# Patient Record
Sex: FEMALE | Race: WHITE
[De-identification: names, ages, dates, MRNs, and addresses within clinical notes are randomized per-mention and may not be internally consistent; named-entity substitution may affect disease eponyms.]

---

## 2019-11-01 ENCOUNTER — HOSPITAL ENCOUNTER (OUTPATIENT)
Dept: HOSPITAL 76 - LAB.WCP | Age: 24
Discharge: HOME | End: 2019-11-01
Attending: ADVANCED PRACTICE MIDWIFE
Payer: COMMERCIAL

## 2019-11-01 DIAGNOSIS — N97.0: Primary | ICD-10-CM

## 2019-11-01 LAB
PROLACTIN SERPL-MCNC: 7.88 NG/ML
T4 SERPL-MCNC: 6.94 UG/DL (ref 6.09–12.23)
TSH SERPL-ACNC: 1.33 UIU/ML (ref 0.34–5.6)

## 2019-11-01 PROCEDURE — 84146 ASSAY OF PROLACTIN: CPT

## 2019-11-01 PROCEDURE — 36415 COLL VENOUS BLD VENIPUNCTURE: CPT

## 2019-11-01 PROCEDURE — 84443 ASSAY THYROID STIM HORMONE: CPT

## 2019-11-01 PROCEDURE — 84436 ASSAY OF TOTAL THYROXINE: CPT

## 2019-11-06 ENCOUNTER — HOSPITAL ENCOUNTER (OUTPATIENT)
Dept: HOSPITAL 76 - LAB.R | Age: 24
Discharge: HOME | End: 2019-11-06
Attending: ADVANCED PRACTICE MIDWIFE
Payer: COMMERCIAL

## 2019-11-06 DIAGNOSIS — N97.0: Primary | ICD-10-CM

## 2019-11-06 PROCEDURE — 84144 ASSAY OF PROGESTERONE: CPT

## 2019-11-11 ENCOUNTER — HOSPITAL ENCOUNTER (OUTPATIENT)
Dept: HOSPITAL 76 - LAB.N | Age: 24
Discharge: HOME | End: 2019-11-11
Attending: ADVANCED PRACTICE MIDWIFE
Payer: COMMERCIAL

## 2019-11-11 DIAGNOSIS — Z32.01: Primary | ICD-10-CM

## 2019-11-11 PROCEDURE — 36415 COLL VENOUS BLD VENIPUNCTURE: CPT

## 2019-11-11 PROCEDURE — 84702 CHORIONIC GONADOTROPIN TEST: CPT

## 2019-11-15 ENCOUNTER — HOSPITAL ENCOUNTER (OUTPATIENT)
Dept: HOSPITAL 76 - EMS | Age: 24
Discharge: ANOTHER HOSPITAL | End: 2019-11-15
Attending: SURGERY
Payer: COMMERCIAL

## 2019-11-15 ENCOUNTER — HOSPITAL ENCOUNTER (EMERGENCY)
Dept: HOSPITAL 76 - ED | Age: 24
Discharge: HOME | End: 2019-11-15
Payer: COMMERCIAL

## 2019-11-15 VITALS — SYSTOLIC BLOOD PRESSURE: 112 MMHG | DIASTOLIC BLOOD PRESSURE: 80 MMHG

## 2019-11-15 DIAGNOSIS — O99.89: Primary | ICD-10-CM

## 2019-11-15 DIAGNOSIS — R10.32: ICD-10-CM

## 2019-11-15 DIAGNOSIS — R55: ICD-10-CM

## 2019-11-15 DIAGNOSIS — Z3A.01: ICD-10-CM

## 2019-11-15 DIAGNOSIS — R00.0: ICD-10-CM

## 2019-11-15 LAB
ALBUMIN DIAFP-MCNC: 4.4 G/DL (ref 3.2–5.5)
ALBUMIN/GLOB SERPL: 1.7 {RATIO} (ref 1–2.2)
ALP SERPL-CCNC: 50 IU/L (ref 42–121)
ALT SERPL W P-5'-P-CCNC: 16 IU/L (ref 10–60)
ANION GAP SERPL CALCULATED.4IONS-SCNC: 8 MMOL/L (ref 6–13)
AST SERPL W P-5'-P-CCNC: 18 IU/L (ref 10–42)
BASOPHILS NFR BLD AUTO: 0 10^3/UL (ref 0–0.1)
BASOPHILS NFR BLD AUTO: 0.2 %
BILIRUB BLD-MCNC: 0.9 MG/DL (ref 0.2–1)
BUN SERPL-MCNC: 9 MG/DL (ref 6–20)
CALCIUM UR-MCNC: 8.8 MG/DL (ref 8.5–10.3)
CHLORIDE SERPL-SCNC: 106 MMOL/L (ref 101–111)
CLARITY UR REFRACT.AUTO: (no result)
CLARITY UR REFRACT.AUTO: CLEAR
CO2 SERPL-SCNC: 24 MMOL/L (ref 21–32)
CREAT SERPLBLD-SCNC: 0.6 MG/DL (ref 0.4–1)
EOSINOPHIL # BLD AUTO: 0.1 10^3/UL (ref 0–0.7)
EOSINOPHIL NFR BLD AUTO: 2.2 %
ERYTHROCYTE [DISTWIDTH] IN BLOOD BY AUTOMATED COUNT: 14.8 % (ref 12–15)
GFRSERPLBLD MDRD-ARVRAT: 123 ML/MIN/{1.73_M2} (ref 89–?)
GLOBULIN SER-MCNC: 2.6 G/DL (ref 2.1–4.2)
GLUCOSE SERPL-MCNC: 94 MG/DL (ref 70–100)
GLUCOSE UR QL STRIP.AUTO: NEGATIVE MG/DL
GLUCOSE UR QL STRIP.AUTO: NEGATIVE MG/DL
HGB UR QL STRIP: 11.3 G/DL (ref 12–16)
KETONES UR QL STRIP.AUTO: (no result) MG/DL
KETONES UR QL STRIP.AUTO: NEGATIVE MG/DL
LIPASE SERPL-CCNC: 33 U/L (ref 22–51)
LYMPHOCYTES # SPEC AUTO: 1.4 10^3/UL (ref 1.5–3.5)
LYMPHOCYTES NFR BLD AUTO: 29 %
MCH RBC QN AUTO: 27.2 PG (ref 27–31)
MCHC RBC AUTO-ENTMCNC: 32.3 G/DL (ref 32–36)
MCV RBC AUTO: 84.1 FL (ref 81–99)
MONOCYTES # BLD AUTO: 0.4 10^3/UL (ref 0–1)
MONOCYTES NFR BLD AUTO: 8.2 %
NEUTROPHILS # BLD AUTO: 3 10^3/UL (ref 1.5–6.6)
NEUTROPHILS # SNV AUTO: 4.9 X10^3/UL (ref 4.8–10.8)
NEUTROPHILS NFR BLD AUTO: 60.2 %
NITRITE UR QL STRIP.AUTO: NEGATIVE
NITRITE UR QL STRIP.AUTO: NEGATIVE
PDW BLD AUTO: 10.6 FL (ref 7.9–10.8)
PH UR STRIP.AUTO: 7 PH (ref 5–7.5)
PH UR STRIP.AUTO: 7.5 PH (ref 5–7.5)
PLATELET # BLD: 203 10^3/UL (ref 130–450)
PROT SPEC-MCNC: 7 G/DL (ref 6.7–8.2)
PROT UR STRIP.AUTO-MCNC: NEGATIVE MG/DL
PROT UR STRIP.AUTO-MCNC: NEGATIVE MG/DL
RBC # UR STRIP.AUTO: NEGATIVE /UL
RBC # UR STRIP.AUTO: NEGATIVE /UL
RBC MAR: 4.16 10^6/UL (ref 4.2–5.4)
SODIUM SERPLBLD-SCNC: 138 MMOL/L (ref 135–145)
SP GR UR STRIP.AUTO: 1.01 (ref 1–1.03)
SP GR UR STRIP.AUTO: 1.01 (ref 1–1.03)
SQUAMOUS URNS QL MICRO: (no result)
UROBILINOGEN UR QL STRIP.AUTO: (no result) E.U./DL
UROBILINOGEN UR QL STRIP.AUTO: (no result) E.U./DL
UROBILINOGEN UR STRIP.AUTO-MCNC: NEGATIVE MG/DL
UROBILINOGEN UR STRIP.AUTO-MCNC: NEGATIVE MG/DL

## 2019-11-15 PROCEDURE — 85025 COMPLETE CBC W/AUTO DIFF WBC: CPT

## 2019-11-15 PROCEDURE — 76817 TRANSVAGINAL US OBSTETRIC: CPT

## 2019-11-15 PROCEDURE — 76801 OB US < 14 WKS SINGLE FETUS: CPT

## 2019-11-15 PROCEDURE — 80053 COMPREHEN METABOLIC PANEL: CPT

## 2019-11-15 PROCEDURE — 93005 ELECTROCARDIOGRAM TRACING: CPT

## 2019-11-15 PROCEDURE — 81003 URINALYSIS AUTO W/O SCOPE: CPT

## 2019-11-15 PROCEDURE — 99284 EMERGENCY DEPT VISIT MOD MDM: CPT

## 2019-11-15 PROCEDURE — 99283 EMERGENCY DEPT VISIT LOW MDM: CPT

## 2019-11-15 PROCEDURE — 87086 URINE CULTURE/COLONY COUNT: CPT

## 2019-11-15 PROCEDURE — 83690 ASSAY OF LIPASE: CPT

## 2019-11-15 PROCEDURE — 36415 COLL VENOUS BLD VENIPUNCTURE: CPT

## 2019-11-15 PROCEDURE — 84702 CHORIONIC GONADOTROPIN TEST: CPT

## 2019-11-15 PROCEDURE — 81001 URINALYSIS AUTO W/SCOPE: CPT

## 2019-11-15 NOTE — ULTRASOUND REPORT
Reason:  pelvic pain, syncope

Procedure Date:  11/15/2019   

Accession Number:  [id number] / [id number]                    

Procedure:  US  - OB Transvaginal CPT Code:  

 

***Final Report***

 

 

FULL RESULT:

 

 

EXAM:

FIRST TRIMESTER OBSTETRIC ULTRASOUND

(Less than 11 weeks)

 

EXAM DATE: 11/15/2019 03:03 PM.

 

CLINICAL HISTORY: Pelvic pain. Syncope. LMP 10/10/2019. HCG 3584.

LMP: 10/10/2019.

 

COMPARISONS: None.

 

TECHNIQUE: Transabdominal and transvaginal ultrasound examination with 

static image documentation.

 

CLINICAL DATES:

EGA 5 weeks, 2 days with DARLING 07/16/2020 based on LMP.

 

ASSESSMENT:

Gestational Sac: Single intrauterine cystic structure is seen within the 

endometrial cavity.  If this were to represent a gestational sac the mean 

gestational sac diameter: 5.2 mm = 5 weeks, 2 days.

Embryo: CRL (crown-rump length) may be visualized although not 

definitively and may measure 1.8 mm.

Cardiac activity: None present.

Yolk sac: Not definitively seen.

Amniotic fluid: Not accurately assessed at this gestational age.

Early placenta: Not visible at this gestational age.

Other: No perigestational fluid collection demonstrated.

 

MATERNAL STRUCTURES:

Uterus: Retroverted. Unremarkable.

Cervix: Closed.

Right Ovary/Adnexa: The ovary measures 4.5 x 2.1 x 2.3 cm, volume 11.3 

cc. Normal echotexture a normal blood flow. Thick-walled cystic 1.5 x 1.0 

x 0.8 cm lesion with peripheral vascularity most likely represents a 

corpus luteal cyst..

Left Ovary/Adnexa: The ovary measures 3.1 x 1.6 x 1.9 cm, volume 4.9 cc.  

Unremarkable.

Free Fluid: Small to moderate volume of pelvic free fluid..

 

Other: None.

IMPRESSION:

1. Intrauterine cystic collection which could represent a gestational 

sac, cyst or focal fluid. If this were to represent a gestational sac 

this would correspond with an estimated gestational age of 5 weeks, 2 

days with DARLING 07/16/2020 based on gestational sac assessment, which is 

concordant with clinical dates. No yolk sac or fetal pole are 

definitively seen although suggested. No secondary findings of ectopic 

pregnancy are identified. Recommend close clinical follow-up and 

correlation with serial serum beta hCG and follow-up ultrasound if 

indicated in 7-10 days.

2. Assigned dating is DARLING 07/16/2020 based on LMP.

3. Normal ovaries with a right ovarian corpus luteal 1.5 cm cyst.

 

[facility]

## 2019-11-15 NOTE — ED PHYSICIAN DOCUMENTATION
History of Present Illness





- Stated complaint


Stated Complaint: SYNCOPAL





- History obtained from


History obtained from: Patient





- History of Present Illness


Timing: Today ( at 5 weeks gestation, she has had some lower abdominal 

cramping today.  No bleeding or fluid loss.  Had a cramp and then had a short 

syncopal episode without injury while walking in her home.  She feels fine now. 

No associated chest pain or trouble breathing.  No significant nausea or 

vomiting.  No diarrhea.)





Review of Systems


Constitutional: denies: Fever, Chills


Cardiac: denies: Chest pain / pressure, Palpitations


Respiratory: denies: Dyspnea, Cough


GI: reports: Abdominal Pain.  denies: Nausea, Vomiting, Diarrhea


: denies: Dysuria, Frequency





PD PAST MEDICAL HISTORY





- Allergies


Allergies/Adverse Reactions: 


                                    Allergies











Allergy/AdvReac Type Severity Reaction Status Date / Time


 


No Known Drug Allergies Allergy   Verified 11/15/19 12:48














PD ED PE NORMAL





- Vitals


Vital signs reviewed: Yes





- General


General: Alert and oriented X 3, No acute distress





- Cardiac


Cardiac: RRR, No murmur





- Respiratory


Respiratory: No respiratory distress, Clear bilaterally





- Abdomen


Abdomen: Normal bowel sounds, Soft, Non tender





- Female 


Female : Other (Bedside ultrasound demonstrates a very full bladder without 

obvious intrauterine pregnancy)





- Back


Back: No CVA TTP, No spinal TTP





- Extremities


Extremities: No edema, No calf tenderness / cord





- Neuro


Neuro: Alert and oriented X 3, Normal speech





Results





- Vitals


Vitals: 


                               Vital Signs - 24 hr











  11/15/19 11/15/19





  12:49 12:59


 


Temperature 36.9 C 


 


Heart Rate 86 86


 


Respiratory 16 16





Rate  


 


Blood Pressure 111/80 111/80


 


O2 Saturation 100 100








                                     Oxygen











O2 Source                      Room air

















- EKG (time done)


  ** 1259


Rate: Rate (enter#) (101)


Rhythm: Sinus tachycardia


Axis: Normal


Intervals: Normal SD


QRS: Normal


Ischemia: Normal ST segments


Computer interpretation: Agree with computer





- Labs


Labs: 


                                Laboratory Tests











  11/15/19 11/15/19 11/15/19





  12:58 13:15 13:15


 


WBC   4.9 


 


RBC   4.16 L 


 


Hgb   11.3 L 


 


Hct   35.0 L 


 


MCV   84.1 


 


MCH   27.2 


 


MCHC   32.3 


 


RDW   14.8 


 


Plt Count   203 


 


MPV   10.6 


 


Neut # (Auto)   3.0 


 


Lymph # (Auto)   1.4 L 


 


Mono # (Auto)   0.4 


 


Eos # (Auto)   0.1 


 


Baso # (Auto)   0.0 


 


Absolute Nucleated RBC   0.00 


 


Nucleated RBC %   0.0 


 


Sodium    138


 


Potassium    3.7


 


Chloride    106


 


Carbon Dioxide    24


 


Anion Gap    8.0


 


BUN    9


 


Creatinine    0.6


 


Estimated GFR (MDRD)    123


 


Glucose    94


 


Calcium    8.8


 


Total Bilirubin    0.9


 


AST    18


 


ALT    16


 


Alkaline Phosphatase    50


 


Total Protein    7.0


 


Albumin    4.4


 


Globulin    2.6


 


Albumin/Globulin Ratio    1.7


 


Lipase    33


 


HCG, Quant   


 


Urine Color  YELLOW  


 


Urine Clarity  HAZY  


 


Urine pH  7.5  


 


Ur Specific Gravity  1.010  


 


Urine Protein  NEGATIVE  


 


Urine Glucose (UA)  NEGATIVE  


 


Urine Ketones  NEGATIVE  


 


Urine Occult Blood  NEGATIVE  


 


Urine Nitrite  NEGATIVE  


 


Urine Bilirubin  NEGATIVE  


 


Urine Urobilinogen  0.2 (NORMAL)  


 


Ur Leukocyte Esterase  TRACE H  


 


Urine RBC  None Seen  


 


Urine WBC  6-10 H  


 


Ur Squamous Epith Cells  MANY Squamous H  


 


Urine Bacteria  Few  


 


Ur Microscopic Review  INDICATED  


 


Urine Culture Comments  NOT INDICATED  














  11/15/19 11/15/19





  13:15 13:35


 


WBC  


 


RBC  


 


Hgb  


 


Hct  


 


MCV  


 


MCH  


 


MCHC  


 


RDW  


 


Plt Count  


 


MPV  


 


Neut # (Auto)  


 


Lymph # (Auto)  


 


Mono # (Auto)  


 


Eos # (Auto)  


 


Baso # (Auto)  


 


Absolute Nucleated RBC  


 


Nucleated RBC %  


 


Sodium  


 


Potassium  


 


Chloride  


 


Carbon Dioxide  


 


Anion Gap  


 


BUN  


 


Creatinine  


 


Estimated GFR (MDRD)  


 


Glucose  


 


Calcium  


 


Total Bilirubin  


 


AST  


 


ALT  


 


Alkaline Phosphatase  


 


Total Protein  


 


Albumin  


 


Globulin  


 


Albumin/Globulin Ratio  


 


Lipase  


 


HCG, Quant  3584.00 


 


Urine Color   YELLOW


 


Urine Clarity   CLEAR


 


Urine pH   7.0


 


Ur Specific Gravity   1.015


 


Urine Protein   NEGATIVE


 


Urine Glucose (UA)   NEGATIVE


 


Urine Ketones   TRACE


 


Urine Occult Blood   NEGATIVE


 


Urine Nitrite   NEGATIVE


 


Urine Bilirubin   NEGATIVE


 


Urine Urobilinogen   0.2 (NORMAL)


 


Ur Leukocyte Esterase   NEGATIVE


 


Urine RBC  


 


Urine WBC  


 


Ur Squamous Epith Cells  


 


Urine Bacteria  


 


Ur Microscopic Review   NOT INDICATED


 


Urine Culture Comments   NOT INDICATED














- Rads (name of study)


  ** OB sono


Radiology: See rad report (Likely gestational sac concordant with dates without 

other findings consistent with ectopic)





PD MEDICAL DECISION MAKING





- ED course


ED course: 





24-year-old woman presents with a syncopal episode in early pregnancy.  Ectopic 

is considered but unlikely based on the work-up today.





Otherwise a negative work-up.





Anemia was discussed with the patient, she says this is chronic and long-

standing.





Departure





- Departure


Disposition: 01 Home, Self Care


Clinical Impression: 


Syncope


Qualifiers:


 Syncope type: vasovagal syncope Qualified Code(s): R55 - Syncope and collapse





Pregnancy


Qualifiers:


 Weeks of gestation: less than 8 weeks Qualified Code(s): Z3A.01 - Less than 8 

weeks gestation of pregnancy





Condition: Good


Record reviewed to determine appropriate education?: Yes


Instructions:  ED Syncope Vasovagal


Comments: 


If you develop more severe cramping, bleeding, or otherwise worsening symptoms 

please return for reevaluation.  Otherwise follow-up with your OB as scheduled.

## 2019-11-29 ENCOUNTER — HOSPITAL ENCOUNTER (OUTPATIENT)
Dept: HOSPITAL 76 - DI | Age: 24
Discharge: HOME | End: 2019-11-29
Attending: ADVANCED PRACTICE MIDWIFE
Payer: COMMERCIAL

## 2019-11-29 DIAGNOSIS — Z32.01: Primary | ICD-10-CM

## 2019-11-29 PROCEDURE — 76801 OB US < 14 WKS SINGLE FETUS: CPT

## 2019-11-29 NOTE — ULTRASOUND REPORT
Reason:  PREGNANCY TEST POSITIVE

Procedure Date:  11/29/2019   

Accession Number:  [id number] / [id number]                    

Procedure:  US  - OB First Trimester CPT Code:  

 

***Final Report***

 

 

FULL RESULT:

 

 

EXAM:

FIRST TRIMESTER OBSTETRIC ULTRASOUND

(Less than 11 weeks)

 

EXAM DATE: 11/29/2019 11:17 AM.

 

CLINICAL HISTORY: PREGNANCY TEST POSITIVE.

LMP: 10/12/2019.

 

COMPARISONS: None.

 

TECHNIQUE: Transabdominal and transvaginal ultrasound examination with 

static image documentation.

 

CLINICAL DATES:

EGA 6 weeks 6 days with DARLING 07/18/2020 based on LMP .

 

ASSESSMENT:

Gestational Sac: Single intrauterine.  Mean gestational sac diameter: 

19.2 mm = 6 weeks 6 days.

Embryo: CRL (crown-rump length) 8.7 mm = 6 weeks 6 days.

Cardiac activity: 1343 beats per minute.

Yolk sac: 4.5 mm.

Amniotic fluid: Not accurately assessed at this gestational age.

Early placenta: Not visible at this gestational age.

Other: Small perigestational anechoic fluid collection demonstrated 0.4 x 

0.6 x 0.5 cm and 0.6 x 0.6 x 0.6 cm..

 

MATERNAL STRUCTURES:

Uterus:  Retroverted. Unremarkable.

Cervix: Closed.

Right Ovary/Adnexa: The ovary measures 5 x 2.2 x 2.1 cm, volume 12 cc. 

Corpus luteal cyst 2.6 cm.

Left Ovary/Adnexa: The ovary measures 1.8 x 2.8 x 1.7 cm, volume 4.3 cc.  

Unremarkable.

Free Fluid: Small.

 

Other: None.

IMPRESSION:

1. Single viable intrauterine pregnancy at EGA 6 weeks 6 days with DARLING 

07/18/2020 based on crown-rump length, which is concordant with clinical 

dates. Small perigestational fluid collections largest 0.6 cm

 

 

[facility]

## 2019-12-06 ENCOUNTER — HOSPITAL ENCOUNTER (OUTPATIENT)
Dept: HOSPITAL 76 - LAB.R | Age: 24
Discharge: HOME | End: 2019-12-06
Attending: ADVANCED PRACTICE MIDWIFE
Payer: COMMERCIAL

## 2019-12-06 DIAGNOSIS — Z36.89: Primary | ICD-10-CM

## 2019-12-06 LAB
AMPHET UR QL SCN: NEGATIVE
BENZODIAZ UR QL SCN: NEGATIVE
COCAINE UR-SCNC: NEGATIVE UMOL/L
METHADONE UR QL SCN: NEGATIVE
METHAMPHET UR QL SCN: NEGATIVE
OPIATES UR QL SCN: NEGATIVE
T VAGINALIS RRNA GENITAL QL PROBE: NEGATIVE
VOLATILE DRUGS POS SERPL SCN: (no result)

## 2019-12-06 PROCEDURE — 87086 URINE CULTURE/COLONY COUNT: CPT

## 2019-12-06 PROCEDURE — 80306 DRUG TEST PRSMV INSTRMNT: CPT

## 2019-12-06 PROCEDURE — 87661 TRICHOMONAS VAGINALIS AMPLIF: CPT

## 2019-12-06 PROCEDURE — 87491 CHLMYD TRACH DNA AMP PROBE: CPT

## 2019-12-06 PROCEDURE — 87591 N.GONORRHOEAE DNA AMP PROB: CPT

## 2020-01-07 ENCOUNTER — HOSPITAL ENCOUNTER (OUTPATIENT)
Dept: HOSPITAL 76 - LAB.R | Age: 25
Discharge: HOME | End: 2020-01-07
Attending: ADVANCED PRACTICE MIDWIFE
Payer: COMMERCIAL

## 2020-01-07 DIAGNOSIS — R30.0: Primary | ICD-10-CM

## 2020-01-07 PROCEDURE — 87086 URINE CULTURE/COLONY COUNT: CPT

## 2020-01-17 ENCOUNTER — HOSPITAL ENCOUNTER (EMERGENCY)
Dept: HOSPITAL 76 - ED | Age: 25
LOS: 1 days | Discharge: HOME | End: 2020-01-18
Payer: COMMERCIAL

## 2020-01-17 DIAGNOSIS — R10.2: ICD-10-CM

## 2020-01-17 DIAGNOSIS — O99.89: Primary | ICD-10-CM

## 2020-01-17 DIAGNOSIS — O44.42: ICD-10-CM

## 2020-01-17 DIAGNOSIS — Z3A.14: ICD-10-CM

## 2020-01-17 LAB
ALBUMIN DIAFP-MCNC: 3.7 G/DL (ref 3.2–5.5)
ALBUMIN/GLOB SERPL: 1.1 {RATIO} (ref 1–2.2)
ALP SERPL-CCNC: 45 IU/L (ref 42–121)
ALT SERPL W P-5'-P-CCNC: 11 IU/L (ref 10–60)
ANION GAP SERPL CALCULATED.4IONS-SCNC: 9 MMOL/L (ref 6–13)
AST SERPL W P-5'-P-CCNC: 15 IU/L (ref 10–42)
BASOPHILS NFR BLD AUTO: 0 10^3/UL (ref 0–0.1)
BASOPHILS NFR BLD AUTO: 0.2 %
BILIRUB BLD-MCNC: 0.6 MG/DL (ref 0.2–1)
BUN SERPL-MCNC: 6 MG/DL (ref 6–20)
CALCIUM UR-MCNC: 9.5 MG/DL (ref 8.5–10.3)
CHLORIDE SERPL-SCNC: 103 MMOL/L (ref 101–111)
CLARITY UR REFRACT.AUTO: CLEAR
CO2 SERPL-SCNC: 23 MMOL/L (ref 21–32)
CREAT SERPLBLD-SCNC: 0.5 MG/DL (ref 0.4–1)
EOSINOPHIL # BLD AUTO: 0.1 10^3/UL (ref 0–0.7)
EOSINOPHIL NFR BLD AUTO: 1.7 %
ERYTHROCYTE [DISTWIDTH] IN BLOOD BY AUTOMATED COUNT: 13.5 % (ref 12–15)
GFRSERPLBLD MDRD-ARVRAT: 152 ML/MIN/{1.73_M2} (ref 89–?)
GLOBULIN SER-MCNC: 3.3 G/DL (ref 2.1–4.2)
GLUCOSE SERPL-MCNC: 101 MG/DL (ref 70–100)
GLUCOSE UR QL STRIP.AUTO: NEGATIVE MG/DL
HCG UR QL: POSITIVE
HGB UR QL STRIP: 11.3 G/DL (ref 12–16)
KETONES UR QL STRIP.AUTO: NEGATIVE MG/DL
LIPASE SERPL-CCNC: 40 U/L (ref 22–51)
LYMPHOCYTES # SPEC AUTO: 1.6 10^3/UL (ref 1.5–3.5)
LYMPHOCYTES NFR BLD AUTO: 24.2 %
MCH RBC QN AUTO: 27.4 PG (ref 27–31)
MCHC RBC AUTO-ENTMCNC: 32.8 G/DL (ref 32–36)
MCV RBC AUTO: 83.5 FL (ref 81–99)
MONOCYTES # BLD AUTO: 0.4 10^3/UL (ref 0–1)
MONOCYTES NFR BLD AUTO: 5.6 %
NEUTROPHILS # BLD AUTO: 4.4 10^3/UL (ref 1.5–6.6)
NEUTROPHILS # SNV AUTO: 6.4 X10^3/UL (ref 4.8–10.8)
NEUTROPHILS NFR BLD AUTO: 68 %
NITRITE UR QL STRIP.AUTO: NEGATIVE
PDW BLD AUTO: 10.9 FL (ref 7.9–10.8)
PH UR STRIP.AUTO: 6 PH (ref 5–7.5)
PLATELET # BLD: 181 10^3/UL (ref 130–450)
PROT SPEC-MCNC: 7 G/DL (ref 6.7–8.2)
PROT UR STRIP.AUTO-MCNC: NEGATIVE MG/DL
RBC # UR STRIP.AUTO: NEGATIVE /UL
RBC MAR: 4.13 10^6/UL (ref 4.2–5.4)
SODIUM SERPLBLD-SCNC: 135 MMOL/L (ref 135–145)
SP GR UR STRIP.AUTO: 1.02 (ref 1–1.03)
SP GR UR STRIP.AUTO: 1.02 (ref 1–1.03)
UROBILINOGEN UR QL STRIP.AUTO: (no result) E.U./DL
UROBILINOGEN UR STRIP.AUTO-MCNC: NEGATIVE MG/DL

## 2020-01-17 PROCEDURE — 85025 COMPLETE CBC W/AUTO DIFF WBC: CPT

## 2020-01-17 PROCEDURE — 81003 URINALYSIS AUTO W/O SCOPE: CPT

## 2020-01-17 PROCEDURE — 87086 URINE CULTURE/COLONY COUNT: CPT

## 2020-01-17 PROCEDURE — 80053 COMPREHEN METABOLIC PANEL: CPT

## 2020-01-17 PROCEDURE — 81001 URINALYSIS AUTO W/SCOPE: CPT

## 2020-01-17 PROCEDURE — 81025 URINE PREGNANCY TEST: CPT

## 2020-01-17 PROCEDURE — 36415 COLL VENOUS BLD VENIPUNCTURE: CPT

## 2020-01-17 PROCEDURE — 76805 OB US >/= 14 WKS SNGL FETUS: CPT

## 2020-01-17 PROCEDURE — 99284 EMERGENCY DEPT VISIT MOD MDM: CPT

## 2020-01-17 PROCEDURE — 83690 ASSAY OF LIPASE: CPT

## 2020-01-17 NOTE — ULTRASOUND REPORT
Reason:  15 weeks pregnant, pelvic pain

Procedure Date:  01/17/2020   

Accession Number:  [id number] / [id number]                    

Procedure:  US  - OB 14+ Weeks CPT Code:  

 

***Final Report***

 

 

FULL RESULT:

 

 

EXAM:

LIMITED OBSTETRICAL ULTRASOUND

 

EXAM DATE: 1/17/2020 09:54 PM.

 

CLINICAL HISTORY: 15 weeks pregnant, pelvic pain.

 

COMPARISON: None.

 

TECHNIQUE: Real-time sonographic evaluation of the fetus performed by the 

sonographer. Multiple representative static images were saved for review. 

Additional transvaginal imaging to more accurately evaluate cervical 

length/placental position/etc.

 

DATING:

 

Established EGA 14 weeks/2 days with DARLING 7 15 20.

 

GENERAL EVALUATION

Mireles pregnancy.

Cardiac activity: 140 bpm.

Fetal movement: Visualized.

Presentation: Variable.

Placenta: Low-lying position. Less than 2 cm from internal loss, likely 

due to early pregnancy.

Amniotic fluid: Normal. MEGHANN 11.7 cm. MVP 3.4 cm.

 

FETAL BIOMETRY:

 

BPD: 2.59 cm, 14 weeks 4 days

HC: 9.79 cm, 14 weeks 4 days

FL: 1.22 cm, 13 weeks 4 days

AC: 8.12 cm, 14 weeks 3 days

 

US age: 14 weeks 2 days, DARLING of 7/15/2020.

 

MATERNAL STRUCTURES

Left ovary: 3.6 x 1.3 x 1.8 cm. Normal vascular flow seen.

 

Cervix: Long and closed

IMPRESSION:

 

1. Mireles live intrauterine pregnancy with gestational age 14 weeks/2 

days based on current ultrasound.

 

2. Low-lying placenta, less than 2 cm from internal os is likely 

secondary to early pregnancy. Cervix long and closed.

 

3. Detailed fetal anatomic survey and placental position to be reassessed 

between 18-20 weeks of gestation.

 

[facility]

## 2020-01-17 NOTE — ED PHYSICIAN DOCUMENTATION
PD HPI FEMALE 





- Stated complaint


Stated Complaint: PELVIC PX/HIP PX





- Chief complaint


Chief Complaint: Abd Pain





- History obtained from


History obtained from: Patient





- History of Present Illness


Timing - onset: How many weeks ago (3-4)


Timing - duration: Weeks


Timing - details: Gradual onset, Waxing and waning


Pain level max: 8


Associated symptoms: Back pain, Pelvic pain.  No: Fever, Abdominal pain, Vaginal

pain, Vaginal bleeding, Vaginal discharge, Dysuria, Urinary frequency, Hematuria


Contributing factors: Pregnant (15 weeks)


OB-GYN History: G (2), P (1), Prior vag delivery


Recently seen: Other (evaluated by midwife weeks ago, rx macrobid for possible 

UTI (patient says culture was inconclusive/mixed w/ possible contaminant spp). 

Her pain persists and she called her midwife today and was advised to come to ED

for further evaluation)





Review of Systems


Constitutional: denies: Fever, Chills, Sweats


GI: reports: Nausea.  denies: Abdominal Pain, Vomiting


: reports: Now pregnant EGA (15 weeks).  denies: Dysuria, Frequency, Vaginal 

bleeding


Musculoskeletal: reports: Back pain (pelvic pain radiates to back)





PD PAST MEDICAL HISTORY





- Past Medical History


Past Medical History: No





- Past Surgical History


Past Surgical History: No





- Present Medications


Home Medications: 


                                Ambulatory Orders











 Medication  Instructions  Recorded  Confirmed


 


No Known Home Medications  01/18/20 01/18/20














- Allergies


Allergies/Adverse Reactions: 


                                    Allergies











Allergy/AdvReac Type Severity Reaction Status Date / Time


 


latex AdvReac  Itching Verified 01/17/20 16:54














- Social History


Does the pt smoke?: No


Smoking Status: Never smoker


Does the pt drink ETOH?: No


Does the pt have substance abuse?: No





- Immunizations


Immunizations are current?: Yes





- POLST


Patient has POLST: No





PD ED PE NORMAL





- Vitals


Vital signs reviewed: Yes





- General


General: Alert and oriented X 3, No acute distress, Well developed/nourished





- HEENT


HEENT: Moist mucous membranes





- Cardiac


Cardiac: RRR, No murmur





- Respiratory


Respiratory: No respiratory distress, Clear bilaterally





- Abdomen


Abdomen: Soft, Non distended, Other (mild tenderness across anterior pelvis 

without rebound or guarding)





- Back


Back: No CVA TTP





Results





- Vitals


Vitals: 


                                     Oxygen











O2 Source                      Room air

















- Labs


Labs: 


                                Laboratory Tests











  01/17/20 01/17/20 01/17/20





  17:05 17:05 17:30


 


WBC  6.4  


 


RBC  4.13 L  


 


Hgb  11.3 L  


 


Hct  34.5 L  


 


MCV  83.5  


 


MCH  27.4  


 


MCHC  32.8  


 


RDW  13.5  


 


Plt Count  181  


 


MPV  10.9 H  


 


Neut # (Auto)  4.4  


 


Lymph # (Auto)  1.6  


 


Mono # (Auto)  0.4  


 


Eos # (Auto)  0.1  


 


Baso # (Auto)  0.0  


 


Absolute Nucleated RBC  0.00  


 


Nucleated RBC %  0.0  


 


Sodium   135 


 


Potassium   3.2 L 


 


Chloride   103 


 


Carbon Dioxide   23 


 


Anion Gap   9.0 


 


BUN   6 


 


Creatinine   0.5 


 


Estimated GFR (MDRD)   152 


 


Glucose   101 H 


 


Calcium   9.5 


 


Total Bilirubin   0.6 


 


AST   15 


 


ALT   11 


 


Alkaline Phosphatase   45 


 


Total Protein   7.0 


 


Albumin   3.7 


 


Globulin   3.3 


 


Albumin/Globulin Ratio   1.1 


 


Lipase   40 


 


Urine Color    YELLOW


 


Urine Clarity    CLEAR


 


Urine pH    6.0


 


Ur Specific Gravity    1.025


 


Urine Protein    NEGATIVE


 


Urine Glucose (UA)    NEGATIVE


 


Urine Ketones    NEGATIVE


 


Urine Occult Blood    NEGATIVE


 


Urine Nitrite    NEGATIVE


 


Urine Bilirubin    NEGATIVE


 


Urine Urobilinogen    1 (NORMAL)


 


Ur Leukocyte Esterase    NEGATIVE


 


Ur Microscopic Review    NOT INDICATED


 


Urine Culture Comments    NOT INDICATED


 


Urine HCG, Qual   














  01/17/20





  17:30


 


WBC 


 


RBC 


 


Hgb 


 


Hct 


 


MCV 


 


MCH 


 


MCHC 


 


RDW 


 


Plt Count 


 


MPV 


 


Neut # (Auto) 


 


Lymph # (Auto) 


 


Mono # (Auto) 


 


Eos # (Auto) 


 


Baso # (Auto) 


 


Absolute Nucleated RBC 


 


Nucleated RBC % 


 


Sodium 


 


Potassium 


 


Chloride 


 


Carbon Dioxide 


 


Anion Gap 


 


BUN 


 


Creatinine 


 


Estimated GFR (MDRD) 


 


Glucose 


 


Calcium 


 


Total Bilirubin 


 


AST 


 


ALT 


 


Alkaline Phosphatase 


 


Total Protein 


 


Albumin 


 


Globulin 


 


Albumin/Globulin Ratio 


 


Lipase 


 


Urine Color 


 


Urine Clarity 


 


Urine pH 


 


Ur Specific Gravity  1.025


 


Urine Protein 


 


Urine Glucose (UA) 


 


Urine Ketones 


 


Urine Occult Blood 


 


Urine Nitrite 


 


Urine Bilirubin 


 


Urine Urobilinogen 


 


Ur Leukocyte Esterase 


 


Ur Microscopic Review 


 


Urine Culture Comments 


 


Urine HCG, Qual  POSITIVE














- Rads (name of study)


  ** OB 14w+ US


Radiology: Prelim report reviewed, See rad report





PD MEDICAL DECISION MAKING





- ED course


Complexity details: reviewed results, re-evaluated patient, considered 

differential, d/w patient





Departure





- Departure


Disposition: 01 Home, Self Care


Clinical Impression: 


 Pregnancy, Abdominal pain





Condition: Good


Instructions:  ED Pelvic Pain Preg UKO 2 or 3 Tri


Follow-Up: 


Sheree Ospina, ERICK, ARNP [Primary Care Provider] - 


Discharge Date/Time: 01/18/20 00:34

## 2020-01-18 VITALS — SYSTOLIC BLOOD PRESSURE: 110 MMHG | DIASTOLIC BLOOD PRESSURE: 65 MMHG

## 2020-01-24 ENCOUNTER — HOSPITAL ENCOUNTER (OUTPATIENT)
Dept: HOSPITAL 76 - EMS | Age: 25
Discharge: ANOTHER HOSPITAL | End: 2020-01-24
Attending: SURGERY
Payer: COMMERCIAL

## 2020-01-24 ENCOUNTER — HOSPITAL ENCOUNTER (EMERGENCY)
Dept: HOSPITAL 76 - ED | Age: 25
Discharge: HOME | End: 2020-01-24
Payer: COMMERCIAL

## 2020-01-24 VITALS — DIASTOLIC BLOOD PRESSURE: 79 MMHG | SYSTOLIC BLOOD PRESSURE: 118 MMHG

## 2020-01-24 DIAGNOSIS — R55: ICD-10-CM

## 2020-01-24 DIAGNOSIS — Z3A.16: ICD-10-CM

## 2020-01-24 DIAGNOSIS — R10.30: ICD-10-CM

## 2020-01-24 DIAGNOSIS — O99.89: Primary | ICD-10-CM

## 2020-01-24 LAB
ALBUMIN DIAFP-MCNC: 3.3 G/DL (ref 3.2–5.5)
ALBUMIN/GLOB SERPL: 1.1 {RATIO} (ref 1–2.2)
ALP SERPL-CCNC: 42 IU/L (ref 42–121)
ALT SERPL W P-5'-P-CCNC: 13 IU/L (ref 10–60)
ANION GAP SERPL CALCULATED.4IONS-SCNC: 8 MMOL/L (ref 6–13)
AST SERPL W P-5'-P-CCNC: 14 IU/L (ref 10–42)
BASOPHILS NFR BLD AUTO: 0 10^3/UL (ref 0–0.1)
BASOPHILS NFR BLD AUTO: 0.2 %
BILIRUB BLD-MCNC: 0.8 MG/DL (ref 0.2–1)
BUN SERPL-MCNC: 8 MG/DL (ref 6–20)
CALCIUM UR-MCNC: 8.5 MG/DL (ref 8.5–10.3)
CASTS URNS QL MICRO: (no result) /LPF
CHLORIDE SERPL-SCNC: 106 MMOL/L (ref 101–111)
CLARITY UR REFRACT.AUTO: (no result)
CO2 SERPL-SCNC: 21 MMOL/L (ref 21–32)
CREAT SERPLBLD-SCNC: 0.5 MG/DL (ref 0.4–1)
EOSINOPHIL # BLD AUTO: 0.1 10^3/UL (ref 0–0.7)
EOSINOPHIL NFR BLD AUTO: 0.9 %
ERYTHROCYTE [DISTWIDTH] IN BLOOD BY AUTOMATED COUNT: 13.5 % (ref 12–15)
GFRSERPLBLD MDRD-ARVRAT: 152 ML/MIN/{1.73_M2} (ref 89–?)
GLOBULIN SER-MCNC: 3 G/DL (ref 2.1–4.2)
GLUCOSE SERPL-MCNC: 89 MG/DL (ref 70–100)
GLUCOSE UR QL STRIP.AUTO: NEGATIVE MG/DL
HGB UR QL STRIP: 10.5 G/DL (ref 12–16)
KETONES UR QL STRIP.AUTO: NEGATIVE MG/DL
LIPASE SERPL-CCNC: 32 U/L (ref 22–51)
LYMPHOCYTES # SPEC AUTO: 1.1 10^3/UL (ref 1.5–3.5)
LYMPHOCYTES NFR BLD AUTO: 19.9 %
MCH RBC QN AUTO: 27.7 PG (ref 27–31)
MCHC RBC AUTO-ENTMCNC: 33.5 G/DL (ref 32–36)
MCV RBC AUTO: 82.6 FL (ref 81–99)
MONOCYTES # BLD AUTO: 0.3 10^3/UL (ref 0–1)
MONOCYTES NFR BLD AUTO: 6 %
MUCOUS THREADS URNS QL MICRO: (no result)
NEUTROPHILS # BLD AUTO: 4 10^3/UL (ref 1.5–6.6)
NEUTROPHILS # SNV AUTO: 5.5 X10^3/UL (ref 4.8–10.8)
NEUTROPHILS NFR BLD AUTO: 72.8 %
NITRITE UR QL STRIP.AUTO: NEGATIVE
PDW BLD AUTO: 10.9 FL (ref 7.9–10.8)
PH UR STRIP.AUTO: 7 PH (ref 5–7.5)
PLATELET # BLD: 138 10^3/UL (ref 130–450)
PROT SPEC-MCNC: 6.3 G/DL (ref 6.7–8.2)
PROT UR STRIP.AUTO-MCNC: NEGATIVE MG/DL
RBC # UR STRIP.AUTO: NEGATIVE /UL
RBC # URNS HPF: (no result) /HPF (ref 0–5)
RBC MAR: 3.79 10^6/UL (ref 4.2–5.4)
SODIUM SERPLBLD-SCNC: 135 MMOL/L (ref 135–145)
SP GR UR STRIP.AUTO: 1.01 (ref 1–1.03)
SQUAMOUS URNS QL MICRO: (no result)
UROBILINOGEN UR QL STRIP.AUTO: (no result) E.U./DL
UROBILINOGEN UR STRIP.AUTO-MCNC: NEGATIVE MG/DL

## 2020-01-24 PROCEDURE — 99284 EMERGENCY DEPT VISIT MOD MDM: CPT

## 2020-01-24 PROCEDURE — 85025 COMPLETE CBC W/AUTO DIFF WBC: CPT

## 2020-01-24 PROCEDURE — 81003 URINALYSIS AUTO W/O SCOPE: CPT

## 2020-01-24 PROCEDURE — 87086 URINE CULTURE/COLONY COUNT: CPT

## 2020-01-24 PROCEDURE — 99283 EMERGENCY DEPT VISIT LOW MDM: CPT

## 2020-01-24 PROCEDURE — 83690 ASSAY OF LIPASE: CPT

## 2020-01-24 PROCEDURE — 80053 COMPREHEN METABOLIC PANEL: CPT

## 2020-01-24 PROCEDURE — 36415 COLL VENOUS BLD VENIPUNCTURE: CPT

## 2020-01-24 PROCEDURE — 81001 URINALYSIS AUTO W/SCOPE: CPT

## 2020-01-24 NOTE — ED PHYSICIAN DOCUMENTATION
PD HPI SYNCOPE





- Stated complaint


Stated Complaint: NEAR SYNCOPAL





- Chief complaint


Chief Complaint: Neuro





- History obtained from


History obtained from: Patient





- History of Present Illness


Witnessed: Witnessed


Timing - onset: How many minutes ago (1)


Duration: Minutes (1)


Associated symptoms: Nausea / vomiting, Abdominal pain.  No: Headache, Chest 

pain


Contributing factors: Noxious stimulae (she was standing in line at store or 

such, and had onset of lower abd cramp with nausea, then felt lightheaded and 

lowered to the ground. No fall nor injury. Has had this happen abruptly a couple

of other times during this pregnancy. No vaginal bleeding. Has history of 

endometriosis in the past with surgery for adhesions in the past.).  No: Recent 

med change, Decreased PO intake


Injury occurred: No: Fell, Head injury, Neck injury


Similar symptoms before: No diagnosis


Recently seen: Clinic (normal prenatal visits.), Emergency Dept (had abd pain 

and syncope when agout 7 weeks pregnant. Felt to be dehydration at the time.)





Review of Systems


Constitutional: denies: Fever, Chills


Nose: denies: Rhinorrhea / runny nose, Congestion


Throat: denies: Sore throat


Respiratory: denies: Cough


GI: reports: Abdominal Pain (for several minutes, improved here in ER.), Nausea,

Vomiting, Constipation.  denies: Diarrhea


: denies: Dysuria, Frequency


Neurologic: denies: Generalized weakness, Confused, Altered mental status, 

Headache





PD PAST MEDICAL HISTORY





- Past Medical History


Past Medical History: Yes


Cardiovascular: None


Respiratory: None


Neuro: None


Endocrine/Autoimmune: None


GI: None


GYN: Endometriosis


: None


HEENT: None


Psych: None


Musculoskeletal: None


Derm: None





- Past Surgical History


Past Surgical History: No





- Present Medications


Home Medications: 


                                Ambulatory Orders











 Medication  Instructions  Recorded  Confirmed


 


Docusate Sodium 100 mg PO DAILY #30 capsule 01/24/20 


 


Naproxen 375 mg PO BID #20 tablet 01/24/20 














- Allergies


Allergies/Adverse Reactions: 


                                    Allergies











Allergy/AdvReac Type Severity Reaction Status Date / Time


 


latex AdvReac  Itching Verified 01/17/20 16:54














- Social History


Does the pt smoke?: No


Smoking Status: Never smoker


Does the pt drink ETOH?: No


Does the pt have substance abuse?: No





- Immunizations


Immunizations are current?: Yes





- POLST


Patient has POLST: No





PD ED PE NORMAL





- Vitals


Vital signs reviewed: Yes





- General


General: Alert and oriented X 3, No acute distress, Well developed/nourished





- HEENT


HEENT: Moist mucous membranes, Pharynx benign





- Neck


Neck: Supple, no meningeal sign, No adenopathy





- Cardiac


Cardiac: RRR, No murmur





- Respiratory


Respiratory: Clear bilaterally, Other





- Abdomen


Abdomen: Normal bowel sounds, Soft, Non distended, No organomegaly, Other 

(gravid with fundus few cm below umbilicus c/w dates. bedside U/S showing normal

movement, fluid, FHR, and no free fluid in pelvis. )





Results





- Vitals


Vitals: 


                                     Oxygen











O2 Source                      Room air

















- Labs


Labs: 


                                Laboratory Tests











  01/24/20 01/24/20 01/24/20





  10:50 10:50 11:06


 


WBC  5.5  


 


RBC  3.79 L  


 


Hgb  10.5 L  


 


Hct  31.3 L  


 


MCV  82.6  


 


MCH  27.7  


 


MCHC  33.5  


 


RDW  13.5  


 


Plt Count  138  


 


MPV  10.9 H  


 


Neut # (Auto)  4.0  


 


Lymph # (Auto)  1.1 L  


 


Mono # (Auto)  0.3  


 


Eos # (Auto)  0.1  


 


Baso # (Auto)  0.0  


 


Absolute Nucleated RBC  0.00  


 


Nucleated RBC %  0.0  


 


Sodium   135 


 


Potassium   3.6 


 


Chloride   106 


 


Carbon Dioxide   21 


 


Anion Gap   8.0 


 


BUN   8 


 


Creatinine   0.5 


 


Estimated GFR (MDRD)   152 


 


Glucose   89 


 


Calcium   8.5 


 


Total Bilirubin   0.8 


 


AST   14 


 


ALT   13 


 


Alkaline Phosphatase   42 


 


Total Protein   6.3 L 


 


Albumin   3.3 


 


Globulin   3.0 


 


Albumin/Globulin Ratio   1.1 


 


Lipase   32 


 


Urine Color    YELLOW


 


Urine Clarity    HAZY


 


Urine pH    7.0


 


Ur Specific Gravity    1.015


 


Urine Protein    NEGATIVE


 


Urine Glucose (UA)    NEGATIVE


 


Urine Ketones    NEGATIVE


 


Urine Occult Blood    NEGATIVE


 


Urine Nitrite    NEGATIVE


 


Urine Bilirubin    NEGATIVE


 


Urine Urobilinogen    1 (NORMAL)


 


Ur Leukocyte Esterase    NEGATIVE


 


Urine RBC    0-5


 


Urine WBC    0-3


 


Ur Squamous Epith Cells    MANY Squamous H


 


Amorphous Sediment    Few


 


Urine Bacteria    Many H


 


Urine Casts    0-2 Granular Casts


 


Urine Mucus    Marked Strands


 


Ur Microscopic Review    INDICATED


 


Urine Culture Comments    NOT INDICATED














PD MEDICAL DECISION MAKING





- ED course


Complexity details: reviewed results (bedside U/S shows normal movement, heart 

rate, and no free fluid in pelvis. her lower pain could be prior adhesions from 

endometriosis or cyst with pregnancy. No obvious problem with the pregnancy. ), 

considered differential, d/w patient





Departure





- Departure


Disposition: 01 Home, Self Care


Clinical Impression: 


 Lower abdominal pain, Syncope, near





Pregnancy


Qualifiers:


 Weeks of gestation: 16 weeks Qualified Code(s): Z3A.16 - 16 weeks gestation of 

pregnancy





Condition: Stable


Record reviewed to determine appropriate education?: Yes


Instructions:  ED Abdominal Pain Unkn Cause


Follow-Up: 


Sheree Ospina CNM, DILIP [Primary Care Provider] - 


Prescriptions: 


Docusate Sodium 100 mg PO DAILY #30 capsule


Naproxen 375 mg PO BID #20 tablet


Comments: 


Stay well-hydrated.  Consider short term anti-inflammatory of naproxen twice 

daily for a week.  Add Tylenol if needed for pains.  Also add docusate stool 

softener daily for the next several days to week and then as needed for 

constipation.  Follow-up with your OB/GYN.





The cause of your pain episode is not clear.  Consider the possibility the 

uterus had an impact on prior adhesions or scar tissue in some of those were 

disrupted.  That would be the reasoning on the anti-inflammatory over the next 

week.


Forms:  Activity restrictions


Discharge Date/Time: 01/24/20 12:42

## 2020-02-05 ENCOUNTER — HOSPITAL ENCOUNTER (OUTPATIENT)
Dept: HOSPITAL 76 - LAB.N | Age: 25
Discharge: HOME | End: 2020-02-05
Attending: ADVANCED PRACTICE MIDWIFE
Payer: COMMERCIAL

## 2020-02-05 DIAGNOSIS — Z36.89: Primary | ICD-10-CM

## 2020-02-05 DIAGNOSIS — O99.89: ICD-10-CM

## 2020-02-05 DIAGNOSIS — R30.0: ICD-10-CM

## 2020-02-05 LAB
BASOPHILS NFR BLD AUTO: 0 10^3/UL (ref 0–0.1)
BASOPHILS NFR BLD AUTO: 0.3 %
EOSINOPHIL # BLD AUTO: 0.1 10^3/UL (ref 0–0.7)
EOSINOPHIL NFR BLD AUTO: 0.7 %
ERYTHROCYTE [DISTWIDTH] IN BLOOD BY AUTOMATED COUNT: 13.7 % (ref 12–15)
HGB UR QL STRIP: 10.5 G/DL (ref 12–16)
LYMPHOCYTES # SPEC AUTO: 1.2 10^3/UL (ref 1.5–3.5)
LYMPHOCYTES NFR BLD AUTO: 17.6 %
MCH RBC QN AUTO: 26.4 PG (ref 27–31)
MCHC RBC AUTO-ENTMCNC: 31.8 G/DL (ref 32–36)
MCV RBC AUTO: 82.9 FL (ref 81–99)
MONOCYTES # BLD AUTO: 0.4 10^3/UL (ref 0–1)
MONOCYTES NFR BLD AUTO: 5.3 %
NEUTROPHILS # BLD AUTO: 5.1 10^3/UL (ref 1.5–6.6)
NEUTROPHILS # SNV AUTO: 6.8 X10^3/UL (ref 4.8–10.8)
NEUTROPHILS NFR BLD AUTO: 75.7 %
PDW BLD AUTO: 12 FL (ref 7.9–10.8)
PLATELET # BLD: 176 10^3/UL (ref 130–450)
RBC MAR: 3.98 10^6/UL (ref 4.2–5.4)

## 2020-02-05 PROCEDURE — 86901 BLOOD TYPING SEROLOGIC RH(D): CPT

## 2020-02-05 PROCEDURE — 87340 HEPATITIS B SURFACE AG IA: CPT

## 2020-02-05 PROCEDURE — 86762 RUBELLA ANTIBODY: CPT

## 2020-02-05 PROCEDURE — 87086 URINE CULTURE/COLONY COUNT: CPT

## 2020-02-05 PROCEDURE — 81599 UNLISTED MAAA: CPT

## 2020-02-05 PROCEDURE — 85025 COMPLETE CBC W/AUTO DIFF WBC: CPT

## 2020-02-05 PROCEDURE — 36415 COLL VENOUS BLD VENIPUNCTURE: CPT

## 2020-02-05 PROCEDURE — 86850 RBC ANTIBODY SCREEN: CPT

## 2020-02-05 PROCEDURE — 87389 HIV-1 AG W/HIV-1&-2 AB AG IA: CPT

## 2020-02-05 PROCEDURE — 86803 HEPATITIS C AB TEST: CPT

## 2020-02-05 PROCEDURE — 86900 BLOOD TYPING SEROLOGIC ABO: CPT

## 2020-02-06 LAB
HEPATITIS B SURFACE ANTIGEN: (no result)
HEPATITIS C ANTIBODY: (no result)
HIV AG/AB 4TH GEN: (no result)
SIGNAL TO CUT-OFF: 0 (ref ?–1)

## 2020-02-14 ENCOUNTER — HOSPITAL ENCOUNTER (OUTPATIENT)
Dept: HOSPITAL 76 - DI | Age: 25
Discharge: HOME | End: 2020-02-14
Attending: ADVANCED PRACTICE MIDWIFE
Payer: COMMERCIAL

## 2020-02-14 DIAGNOSIS — Z36.89: Primary | ICD-10-CM

## 2020-02-14 DIAGNOSIS — Z3A.18: ICD-10-CM

## 2020-02-14 DIAGNOSIS — O44.42: ICD-10-CM

## 2020-02-14 PROCEDURE — 76811 OB US DETAILED SNGL FETUS: CPT

## 2020-02-17 NOTE — ULTRASOUND REPORT
Reason:   SCREENING, SUPERVISION OF PREGNANCY

Procedure Date:  2020   

Accession Number:  [id number] / [id number]                    

Procedure:  US  - OB Detailed Fetal Eval CPT Code:  

 

***Final Report***

 

 

FULL RESULT:

 

 

EXAM:

COMPLETE OBSTETRICAL ULTRASOUND

 

EXAM DATE: 2020 01:17 PM.

 

CLINICAL HISTORY: Fetal anatomic survey.

 

COMPARISON: OB 14+ WEEKS 2020 9:54 PM.

 

TECHNIQUE: Real-time sonographic evaluation of the fetus performed by the 

sonographer. Multiple representative static images were saved for review. 

Additional transvaginal imaging to more accurately evaluate cervical 

length/placental position/etc.

 

DATING:

Established EGA 18 weeks 2 days with DARLING 07/15/2020 based on stated dates.

EGA 17 weeks 6 days with DARLING 2020 based on LMP.

EGA 17 weeks 6 days with DARLING 2020 based on the current ultrasound.

 

GENERAL EVALUATION

Mireles pregnancy.

Cardiac activity: 145 bpm.

Fetal movement: Visualized.

Presentation: Cephalic/variable.

Placenta: Anterior position. Low lying placenta. Placental margin is 1.3 

cm from the internal os.

Umbilical cord: 3 vessel cord. Central placental cord origin.

Amniotic fluid: Subjectively normal. MVP 4.3 cm.

 

FETAL BIOMETRY

Bi-Parietal Diameter (BPD): 3.9 cm, 17 weeks 6 days

Head Circumference (HC):   14.6 cm, 17 weeks 5 days

Abdominal Circumference (AC): 12.2 cm, 17 weeks 6 days

Femur Length (FL): 2.6 cm 17 weeks 6 days ays

 

Estimated Fetal Weight: 212 g, 20.5 percentile for 18 weeks 2 days.

 

FETAL ANATOMY

The intracranial structures, face/nose/lips, spine, 4 chamber heart and 

outflow tracts, stomach, abdominal wall and cord insertion, diaphragm, 

kidneys, bladder, and extremities were visualized and demonstrate no 

abnormality. Profile/nasal bone suboptimally seen.

 

MATERNAL STRUCTURES

Uterus: Unremarkable.

Cervix: Long and closed. Transabdominal length 2.8 cm.

Right ovary/adnexa: Adnexa unremarkable. Right ovary not well seen due to 

bowel gas.

Left ovary/adnexa: Unremarkable.

Free fluid: None.

IMPRESSION:

1. Mireles live intrauterine pregnancy with gestational age 18 weeks 2 

days based on stated dates.

2. Estimated fetal weight is within expected limits for assigned dating.

3. The visualized fetal anatomy appears normal. No fetal anatomic 

abnormalities are detected at this time. Profile/nasal bone suboptimally 

seen due to active fetus.

4. Low lying placenta.

 

RADIA

## 2020-03-06 ENCOUNTER — HOSPITAL ENCOUNTER (OUTPATIENT)
Dept: HOSPITAL 76 - DI | Age: 25
Discharge: HOME | End: 2020-03-06
Attending: ADVANCED PRACTICE MIDWIFE
Payer: COMMERCIAL

## 2020-03-06 DIAGNOSIS — O44.02: Primary | ICD-10-CM

## 2020-03-06 DIAGNOSIS — Z3A.21: ICD-10-CM

## 2020-03-06 PROCEDURE — 76816 OB US FOLLOW-UP PER FETUS: CPT

## 2020-03-06 NOTE — ULTRASOUND REPORT
Reason:  LOW LYING PLACENTA, F/U TO FAS

Procedure Date:  03/06/2020   

Accession Number:  [id number] / [id number]                    

Procedure:  US  - OB F/U or Repeat CPT Code:  

 

***Final Report***

 

 

FULL RESULT:

 

 

EXAM:

FOLLOW-UP OBSTETRICAL ULTRASOUND

 

EXAM DATE: 03/06/2020 09:32 AM.

 

CLINICAL HISTORY: Low lying placenta, follow-up to FAS.

 

COMPARISON: OB DETAILED FETAL EVAL 02/14/2020 12:03 PM.

 

TECHNIQUE: Real-time sonographic evaluation of the fetus performed by the 

sonographer.  Multiple representative static images were saved for review.

 

DATING:

Established EGA 21 weeks 2 days with DARLING 07/15/2020 based on working due 

date.

EGA 20 weeks 6 days with DARLING 07/18/2020 based on first ultrasound and LMP.

 

GENERAL EVALUATION

Mireles pregnancy.

Cardiac activity: 160 bpm.

Fetal movement: Visualized.

Presentation: Cephalic.

Placenta: Anterior position, not low lying.

Amniotic fluid: Normal. MEGHANN 14.9 cm. MVP 4.2 cm.

 

FETAL ANATOMY

The profile view including nasal bone is well visualized and no 

abnormality is seen.

IMPRESSION:

1. Mireles live intrauterine pregnancy with gestational age 21 weeks 2 

days based on working due date.

2. Anterior placenta within normal limits, not low lying

3. Normal completion of the fetal anatomy survey.

 

RADIA

## 2020-03-13 ENCOUNTER — HOSPITAL ENCOUNTER (OUTPATIENT)
Dept: HOSPITAL 76 - LAB.R | Age: 25
Discharge: HOME | End: 2020-03-13
Attending: ADVANCED PRACTICE MIDWIFE
Payer: COMMERCIAL

## 2020-03-13 DIAGNOSIS — N76.0: Primary | ICD-10-CM

## 2020-03-13 LAB
C KRUSEI DNA VAG QL NAA+PROBE: NEGATIVE
CANDIDA DNA VAG QL NAA+PROBE: POSITIVE
T VAGINALIS RRNA GENITAL QL PROBE: NEGATIVE

## 2020-03-13 PROCEDURE — 87661 TRICHOMONAS VAGINALIS AMPLIF: CPT

## 2020-03-13 PROCEDURE — 87801 DETECT AGNT MULT DNA AMPLI: CPT

## 2020-04-08 ENCOUNTER — HOSPITAL ENCOUNTER (OUTPATIENT)
Dept: HOSPITAL 76 - WFO | Age: 25
Discharge: HOME | End: 2020-04-08
Attending: OBSTETRICS & GYNECOLOGY
Payer: COMMERCIAL

## 2020-04-08 VITALS — DIASTOLIC BLOOD PRESSURE: 78 MMHG | SYSTOLIC BLOOD PRESSURE: 115 MMHG

## 2020-04-08 DIAGNOSIS — O99.89: Primary | ICD-10-CM

## 2020-04-08 DIAGNOSIS — Z3A.26: ICD-10-CM

## 2020-04-08 DIAGNOSIS — R10.2: ICD-10-CM

## 2020-04-08 LAB
C KRUSEI DNA VAG QL NAA+PROBE: NEGATIVE
CANDIDA DNA VAG QL NAA+PROBE: NEGATIVE
CLARITY UR REFRACT.AUTO: (no result)
GLUCOSE UR QL STRIP.AUTO: NEGATIVE MG/DL
KETONES UR QL STRIP.AUTO: NEGATIVE MG/DL
NITRITE UR QL STRIP.AUTO: NEGATIVE
PH UR STRIP.AUTO: 7.5 PH (ref 5–7.5)
PROT UR STRIP.AUTO-MCNC: NEGATIVE MG/DL
RBC # UR STRIP.AUTO: NEGATIVE /UL
RBC # URNS HPF: (no result) /HPF (ref 0–5)
SP GR UR STRIP.AUTO: 1.02 (ref 1–1.03)
SQUAMOUS URNS QL MICRO: (no result)
T VAGINALIS RRNA GENITAL QL PROBE: NEGATIVE
T VAGINALIS RRNA GENITAL QL PROBE: NEGATIVE
UROBILINOGEN UR QL STRIP.AUTO: (no result) E.U./DL
UROBILINOGEN UR STRIP.AUTO-MCNC: NEGATIVE MG/DL

## 2020-04-08 PROCEDURE — 87491 CHLMYD TRACH DNA AMP PROBE: CPT

## 2020-04-08 PROCEDURE — 87591 N.GONORRHOEAE DNA AMP PROB: CPT

## 2020-04-08 PROCEDURE — 87661 TRICHOMONAS VAGINALIS AMPLIF: CPT

## 2020-04-08 PROCEDURE — 82731 ASSAY OF FETAL FIBRONECTIN: CPT

## 2020-04-08 PROCEDURE — 87086 URINE CULTURE/COLONY COUNT: CPT

## 2020-04-08 PROCEDURE — 87801 DETECT AGNT MULT DNA AMPLI: CPT

## 2020-04-08 PROCEDURE — 87081 CULTURE SCREEN ONLY: CPT

## 2020-04-08 PROCEDURE — 76817 TRANSVAGINAL US OBSTETRIC: CPT

## 2020-04-08 PROCEDURE — 81001 URINALYSIS AUTO W/SCOPE: CPT

## 2020-04-08 PROCEDURE — 99215 OFFICE O/P EST HI 40 MIN: CPT

## 2020-04-08 NOTE — PROVIDER PROGRESS NOTE
- HPI


Chief Complaint: Labor Check


Current Pregnancy: 


                                                               Current Pregnancy





EDU                              07/15/20


Gestation                        26 Weeks and 0 Days


                          2


Para                             1





Vital Signs











Temperature  98.8 F   20 12:53


 


Heart Rate  102 H  20 12:53


 


Respiratory Rate  18   20 12:53


 


Blood Pressure  115/78   20 12:53


 


O2 Saturation  100   20 12:53




















Temperature  98.8 F   20 12:53


 


Heart Rate  102 H  20 12:53


 


Respiratory Rate  18   20 12:53


 


Blood Pressure  115/78   20 12:53


 


O2 Saturation  100   20 12:53














- Exam





GEN: Mild distress, NAD at close of visit


CV: RR


RESP: Nl effort


ABD: gravid, S&NT/ND


SVE: bladder tender, posterior cervix, high and difficult to palpate. Closed


 mod andres 10x10 accels occ vars--> AGA


TOCO; Quiet





UA wnl


FFN neg


TVCL 3.92 cm





- Procedures


NST Procedure: 


Cat I tracing/ Appropriate for gestational age


Service Date of procedure: 20


Procedure Details: 





NST for suspected  labor


AGA and quiet TOCO


TVCL 3.9 cm, reassuring


FFN neg


Findings: 





AGA and quiet TOCO


TVCL 3.9 cm, reassuring


FFN neg





- Plan


Plan: 





Pelvic pain at 26+0 wga


Last IC more than 2 days ago





FFN neg


TVCL 3.9 cm; reassuring


UA wnl





GCCT and vaginitis panel pending





Patient appears comfortable at close of visit


Reassured regarding TVCL and FFN as suspicion low for PTL


Will contact patient with vaginitis panel results


Warning signs reviewed





FU tomorrow in clinic for scheduled PNV





A total of 2.5 hours was spent with patient in evaluating pelvic pain in 

pregnancy.

## 2020-04-08 NOTE — ULTRASOUND REPORT
Reason:  rule out  labor

Procedure Date:  2020   

Accession Number:  [id number] / [id number]                    

Procedure:  US  - OB Transvaginal CPT Code:  

 

***Final Report***

 

 

FULL RESULT:

 

 

EXAM:

OB TRANSVAGINAL

 

EXAM DATE: 2020 03:10 PM.

 

CLINICAL HISTORY: Concern for  labor. Evaluate cervical length. 

Rule out  labor.

LMP: Unknown.

 

COMPARISONS: OB TRANSVAGINAL 11/15/2019 1:52 PM.

 

TECHNIQUE: Transvaginal and transabdominal ultrasound examination with 

static image documentation.

 

CLINICAL DATES:

EGA 26 weeks 0 days with DARLING 07/15/2020 based on established EGA.

 

ASSESSMENT:

 

Single living intrauterine fetus in breech presentation. Fetal cardiac 

activity is 149 bpm. Placental location is anterior. No previa.

 

The cervix appears long and closed, measures 3.9 cm in length.

IMPRESSION:

1. Long and closed cervix measuring 3.9 cm in length. See above.

 

RADIA

## 2020-05-26 ENCOUNTER — HOSPITAL ENCOUNTER (OUTPATIENT)
Dept: HOSPITAL 76 - LAB.WCP | Age: 25
Discharge: HOME | End: 2020-05-26
Attending: ADVANCED PRACTICE MIDWIFE
Payer: COMMERCIAL

## 2020-05-26 DIAGNOSIS — Z36.89: ICD-10-CM

## 2020-05-26 DIAGNOSIS — Z34.90: Primary | ICD-10-CM

## 2020-05-26 LAB
BASOPHILS NFR BLD AUTO: 0 10^3/UL (ref 0–0.1)
BASOPHILS NFR BLD AUTO: 0.3 %
EOSINOPHIL # BLD AUTO: 0.1 10^3/UL (ref 0–0.7)
EOSINOPHIL NFR BLD AUTO: 1.1 %
ERYTHROCYTE [DISTWIDTH] IN BLOOD BY AUTOMATED COUNT: 16.7 % (ref 12–15)
HGB UR QL STRIP: 8.3 G/DL (ref 12–16)
LYMPHOCYTES # SPEC AUTO: 1.7 10^3/UL (ref 1.5–3.5)
LYMPHOCYTES NFR BLD AUTO: 15.9 %
MCH RBC QN AUTO: 21.6 PG (ref 27–31)
MCHC RBC AUTO-ENTMCNC: 28.2 G/DL (ref 32–36)
MCV RBC AUTO: 76.4 FL (ref 81–99)
MONOCYTES # BLD AUTO: 0.6 10^3/UL (ref 0–1)
MONOCYTES NFR BLD AUTO: 6 %
NEUTROPHILS # BLD AUTO: 7.8 10^3/UL (ref 1.5–6.6)
NEUTROPHILS # SNV AUTO: 10.5 X10^3/UL (ref 4.8–10.8)
NEUTROPHILS NFR BLD AUTO: 74.5 %
PDW BLD AUTO: 11.1 FL (ref 7.9–10.8)
PLAT MORPH BLD: (no result)
PLATELET # BLD: 216 10^3/UL (ref 130–450)
PLATELET BLD QL SMEAR: (no result)
RBC MAR: 3.85 10^6/UL (ref 4.2–5.4)
RBC MORPH BLD: (no result)

## 2020-05-26 PROCEDURE — 82947 ASSAY GLUCOSE BLOOD QUANT: CPT

## 2020-05-26 PROCEDURE — 85025 COMPLETE CBC W/AUTO DIFF WBC: CPT

## 2020-05-26 PROCEDURE — 36415 COLL VENOUS BLD VENIPUNCTURE: CPT

## 2020-05-26 PROCEDURE — 86850 RBC ANTIBODY SCREEN: CPT

## 2020-06-19 ENCOUNTER — HOSPITAL ENCOUNTER (OUTPATIENT)
Dept: HOSPITAL 76 - LAB.WCP | Age: 25
Discharge: HOME | End: 2020-06-19
Attending: ADVANCED PRACTICE MIDWIFE
Payer: COMMERCIAL

## 2020-06-19 DIAGNOSIS — D64.9: ICD-10-CM

## 2020-06-19 DIAGNOSIS — Z3A.00: ICD-10-CM

## 2020-06-19 DIAGNOSIS — O99.019: Primary | ICD-10-CM

## 2020-06-19 LAB
ERYTHROCYTE [DISTWIDTH] IN BLOOD BY AUTOMATED COUNT: 18.4 % (ref 12–15)
HGB UR QL STRIP: 7.5 G/DL (ref 12–16)
IRON SATN MFR SERPL: 4 % (ref 20–50)
IRON SERPL-MCNC: 24 UG/DL (ref 28–170)
MCH RBC QN AUTO: 20.9 PG (ref 27–31)
MCHC RBC AUTO-ENTMCNC: 29.3 G/DL (ref 32–36)
MCV RBC AUTO: 71.3 FL (ref 81–99)
NEUTROPHILS # SNV AUTO: 10.5 X10^3/UL (ref 4.8–10.8)
PDW BLD AUTO: 10.5 FL (ref 7.9–10.8)
PLATELET # BLD: 218 10^3/UL (ref 130–450)
RBC MAR: 3.59 10^6/UL (ref 4.2–5.4)
TIBC SERPL-MCNC: 655 UG/DL (ref 250–450)
TRANSFERRIN SERPL-MCNC: 468 MG/DL (ref 192–382)

## 2020-06-19 PROCEDURE — 85027 COMPLETE CBC AUTOMATED: CPT

## 2020-06-19 PROCEDURE — 36415 COLL VENOUS BLD VENIPUNCTURE: CPT

## 2020-06-19 PROCEDURE — 81599 UNLISTED MAAA: CPT

## 2020-06-19 PROCEDURE — 83540 ASSAY OF IRON: CPT

## 2020-06-19 PROCEDURE — 84466 ASSAY OF TRANSFERRIN: CPT

## 2020-06-19 PROCEDURE — 85018 HEMOGLOBIN: CPT

## 2020-06-19 PROCEDURE — 85014 HEMATOCRIT: CPT

## 2020-06-19 PROCEDURE — 85041 AUTOMATED RBC COUNT: CPT

## 2020-06-19 PROCEDURE — 82728 ASSAY OF FERRITIN: CPT

## 2020-06-19 PROCEDURE — 83021 HEMOGLOBIN CHROMOTOGRAPHY: CPT

## 2020-06-24 ENCOUNTER — HOSPITAL ENCOUNTER (OUTPATIENT)
Dept: HOSPITAL 76 - LAB.R | Age: 25
Discharge: HOME | End: 2020-06-24
Attending: ADVANCED PRACTICE MIDWIFE
Payer: COMMERCIAL

## 2020-06-24 ENCOUNTER — HOSPITAL ENCOUNTER (OUTPATIENT)
Dept: HOSPITAL 76 - WFO | Age: 25
Discharge: HOME | End: 2020-06-24
Attending: ADVANCED PRACTICE MIDWIFE
Payer: COMMERCIAL

## 2020-06-24 VITALS — DIASTOLIC BLOOD PRESSURE: 87 MMHG | SYSTOLIC BLOOD PRESSURE: 125 MMHG

## 2020-06-24 DIAGNOSIS — Z3A.37: ICD-10-CM

## 2020-06-24 DIAGNOSIS — Z36.85: Primary | ICD-10-CM

## 2020-06-24 DIAGNOSIS — D50.9: ICD-10-CM

## 2020-06-24 DIAGNOSIS — O99.013: Primary | ICD-10-CM

## 2020-06-24 LAB — T VAGINALIS RRNA GENITAL QL PROBE: NEGATIVE

## 2020-06-24 PROCEDURE — 59025 FETAL NON-STRESS TEST: CPT

## 2020-06-24 PROCEDURE — 87591 N.GONORRHOEAE DNA AMP PROB: CPT

## 2020-06-24 PROCEDURE — 87661 TRICHOMONAS VAGINALIS AMPLIF: CPT

## 2020-06-24 PROCEDURE — 87491 CHLMYD TRACH DNA AMP PROBE: CPT

## 2020-06-24 PROCEDURE — 96375 TX/PRO/DX INJ NEW DRUG ADDON: CPT

## 2020-06-24 PROCEDURE — 87797 DETECT AGENT NOS DNA DIR: CPT

## 2020-06-24 PROCEDURE — 96374 THER/PROPH/DIAG INJ IV PUSH: CPT

## 2020-06-25 ENCOUNTER — HOSPITAL ENCOUNTER (OUTPATIENT)
Dept: HOSPITAL 76 - WFO | Age: 25
Discharge: HOME | End: 2020-06-25
Attending: ADVANCED PRACTICE MIDWIFE
Payer: COMMERCIAL

## 2020-06-25 VITALS — SYSTOLIC BLOOD PRESSURE: 128 MMHG | DIASTOLIC BLOOD PRESSURE: 85 MMHG

## 2020-06-25 DIAGNOSIS — Z3A.37: ICD-10-CM

## 2020-06-25 DIAGNOSIS — O47.1: Primary | ICD-10-CM

## 2020-06-25 LAB
CLARITY UR REFRACT.AUTO: CLEAR
GLUCOSE UR QL STRIP.AUTO: NEGATIVE MG/DL
KETONES UR QL STRIP.AUTO: NEGATIVE MG/DL
NITRITE UR QL STRIP.AUTO: NEGATIVE
PH UR STRIP.AUTO: 6.5 PH (ref 5–7.5)
PROT UR STRIP.AUTO-MCNC: NEGATIVE MG/DL
RBC # UR STRIP.AUTO: NEGATIVE /UL
SP GR UR STRIP.AUTO: 1.02 (ref 1–1.03)
UROBILINOGEN UR QL STRIP.AUTO: (no result) E.U./DL
UROBILINOGEN UR STRIP.AUTO-MCNC: NEGATIVE MG/DL

## 2020-06-25 PROCEDURE — 99213 OFFICE O/P EST LOW 20 MIN: CPT

## 2020-06-25 PROCEDURE — 87086 URINE CULTURE/COLONY COUNT: CPT

## 2020-06-25 PROCEDURE — 81001 URINALYSIS AUTO W/SCOPE: CPT

## 2020-06-25 PROCEDURE — 81003 URINALYSIS AUTO W/O SCOPE: CPT

## 2020-06-25 NOTE — PROCEDURE REPORT
- HPI


Diagnosis/Indication for NST: Other (Iron infusion, monitor fetal tolerance)


                                                               Current Pregnancy





EDU                              07/15/20


Gestation                        37 Weeks and 0 Days


                          2


Para                             1





Vital Signs











Temperature  37.4 C   20 17:08


 


Heart Rate  112 H  20 17:08


 


Respiratory Rate  17   20 17:08


 


Blood Pressure  133/82 H  20 17:08


 


O2 Saturation  99   20 17:08




















Temperature  37.3 C   20 19:47


 


Heart Rate  114 H  20 19:47


 


Respiratory Rate  18   20 19:47


 


Blood Pressure  125/87 H  20 19:47


 


O2 Saturation  99   20 19:47














- NST Procedure


NST Procedure





Start Date                       20


Start Time                       17:00


Stop Time                        17:26


Vibroacoustic Stimulation Used   Yes


Patient States Fetal Movement    Yes











- Results and Plan


Findings/Impression: 





NST performed 2020


NST Read 2020





Findings:


Reassuring


Baseline 140s, moderate variability, accels and no decels





Plan:


No further NSTs indicated at this time unless further infusions are needed





Final Diagnosis


23yo  and 37.0 wks presented for an iron infusion for severe iron 

deficiency anemia


Continue with regular prenatal care

## 2020-07-06 ENCOUNTER — HOSPITAL ENCOUNTER (OUTPATIENT)
Dept: HOSPITAL 76 - LAB | Age: 25
Discharge: HOME | End: 2020-07-06
Attending: ADVANCED PRACTICE MIDWIFE
Payer: COMMERCIAL

## 2020-07-06 ENCOUNTER — HOSPITAL ENCOUNTER (OUTPATIENT)
Dept: HOSPITAL 76 - WFO | Age: 25
Discharge: HOME | End: 2020-07-06
Attending: ADVANCED PRACTICE MIDWIFE
Payer: COMMERCIAL

## 2020-07-06 VITALS — SYSTOLIC BLOOD PRESSURE: 123 MMHG | DIASTOLIC BLOOD PRESSURE: 83 MMHG

## 2020-07-06 DIAGNOSIS — O99.019: ICD-10-CM

## 2020-07-06 DIAGNOSIS — D64.9: ICD-10-CM

## 2020-07-06 DIAGNOSIS — Z3A.00: ICD-10-CM

## 2020-07-06 DIAGNOSIS — Z3A.38: ICD-10-CM

## 2020-07-06 DIAGNOSIS — O47.1: Primary | ICD-10-CM

## 2020-07-06 DIAGNOSIS — O99.019: Primary | ICD-10-CM

## 2020-07-06 LAB
HGB UR QL STRIP: 9.9 G/DL (ref 12–16)
IGF BP1 VAG QL: NEGATIVE
MCH RBC QN AUTO: 23.9 PG (ref 27–31)
MCHC RBC AUTO-ENTMCNC: 29.7 G/DL (ref 32–36)
MCV RBC AUTO: 80.2 FL (ref 81–99)
NEUTROPHILS # SNV AUTO: 9.1 X10^3/UL (ref 4.8–10.8)
PDW BLD AUTO: 10.7 FL (ref 7.9–10.8)
PLATELET # BLD: 194 10^3/UL (ref 130–450)
RBC MAR: 4.15 10^6/UL (ref 4.2–5.4)

## 2020-07-06 PROCEDURE — 36415 COLL VENOUS BLD VENIPUNCTURE: CPT

## 2020-07-06 PROCEDURE — 99213 OFFICE O/P EST LOW 20 MIN: CPT

## 2020-07-06 PROCEDURE — 85027 COMPLETE CBC AUTOMATED: CPT

## 2020-07-06 PROCEDURE — 84112 EVAL AMNIOTIC FLUID PROTEIN: CPT

## 2020-07-11 ENCOUNTER — HOSPITAL ENCOUNTER (INPATIENT)
Dept: HOSPITAL 76 - WFO | Age: 25
LOS: 3 days | Discharge: HOME | End: 2020-07-14
Attending: OBSTETRICS & GYNECOLOGY | Admitting: OBSTETRICS & GYNECOLOGY
Payer: COMMERCIAL

## 2020-07-11 DIAGNOSIS — Z3A.39: ICD-10-CM

## 2020-07-11 DIAGNOSIS — D50.9: ICD-10-CM

## 2020-07-11 DIAGNOSIS — F41.9: ICD-10-CM

## 2020-07-11 DIAGNOSIS — R00.0: ICD-10-CM

## 2020-07-11 DIAGNOSIS — E07.9: ICD-10-CM

## 2020-07-11 PROCEDURE — 85025 COMPLETE CBC W/AUTO DIFF WBC: CPT

## 2020-07-11 PROCEDURE — 81599 UNLISTED MAAA: CPT

## 2020-07-11 PROCEDURE — 84550 ASSAY OF BLOOD/URIC ACID: CPT

## 2020-07-11 PROCEDURE — 83615 LACTATE (LD) (LDH) ENZYME: CPT

## 2020-07-11 PROCEDURE — 86780 TREPONEMA PALLIDUM: CPT

## 2020-07-11 PROCEDURE — 36415 COLL VENOUS BLD VENIPUNCTURE: CPT

## 2020-07-11 PROCEDURE — 99213 OFFICE O/P EST LOW 20 MIN: CPT

## 2020-07-11 PROCEDURE — 84450 TRANSFERASE (AST) (SGOT): CPT

## 2020-07-12 LAB
AST SERPL W P-5'-P-CCNC: 27 IU/L (ref 10–42)
BASOPHILS NFR BLD AUTO: 0 10^3/UL (ref 0–0.1)
BASOPHILS NFR BLD AUTO: 0.1 10^3/UL (ref 0–0.1)
BASOPHILS NFR BLD AUTO: 0.3 %
BASOPHILS NFR BLD AUTO: 0.3 %
EOSINOPHIL # BLD AUTO: 0 10^3/UL (ref 0–0.7)
EOSINOPHIL # BLD AUTO: 0.1 10^3/UL (ref 0–0.7)
EOSINOPHIL NFR BLD AUTO: 0.1 %
EOSINOPHIL NFR BLD AUTO: 1 %
HGB UR QL STRIP: 10.9 G/DL (ref 12–16)
HGB UR QL STRIP: 9.9 G/DL (ref 12–16)
LYMPHOCYTES # SPEC AUTO: 2.1 10^3/UL (ref 1.5–3.5)
LYMPHOCYTES # SPEC AUTO: 2.2 10^3/UL (ref 1.5–3.5)
LYMPHOCYTES NFR BLD AUTO: 19.3 %
LYMPHOCYTES NFR BLD AUTO: 9.9 %
MCH RBC QN AUTO: 24.2 PG (ref 27–31)
MCH RBC QN AUTO: 24.3 PG (ref 27–31)
MCHC RBC AUTO-ENTMCNC: 29.2 G/DL (ref 32–36)
MCHC RBC AUTO-ENTMCNC: 29.5 G/DL (ref 32–36)
MCV RBC AUTO: 82.2 FL (ref 81–99)
MCV RBC AUTO: 83.3 FL (ref 81–99)
MONOCYTES # BLD AUTO: 0.8 10^3/UL (ref 0–1)
MONOCYTES # BLD AUTO: 1 10^3/UL (ref 0–1)
MONOCYTES NFR BLD AUTO: 4.6 %
MONOCYTES NFR BLD AUTO: 6.7 %
NEUTROPHILS # BLD AUTO: 17.7 10^3/UL (ref 1.5–6.6)
NEUTROPHILS # BLD AUTO: 8.1 10^3/UL (ref 1.5–6.6)
NEUTROPHILS # SNV AUTO: 11.3 X10^3/UL (ref 4.8–10.8)
NEUTROPHILS # SNV AUTO: 21 X10^3/UL (ref 4.8–10.8)
NEUTROPHILS NFR BLD AUTO: 71.5 %
NEUTROPHILS NFR BLD AUTO: 84.1 %
PDW BLD AUTO: 10.1 FL (ref 7.9–10.8)
PDW BLD AUTO: 9.9 FL (ref 7.9–10.8)
PLAT MORPH BLD: (no result)
PLAT MORPH BLD: (no result)
PLATELET # BLD: 285 10^3/UL (ref 130–450)
PLATELET # BLD: 297 10^3/UL (ref 130–450)
PLATELET BLD QL SMEAR: (no result)
PLATELET BLD QL SMEAR: (no result)
RBC MAR: 4.09 10^6/UL (ref 4.2–5.4)
RBC MAR: 4.48 10^6/UL (ref 4.2–5.4)
RBC MORPH BLD: (no result)
RBC MORPH BLD: (no result)
URATE SERPL-MCNC: 4.4 MG/DL (ref 2.6–7.2)

## 2020-07-12 RX ADMIN — ONDANSETRON PRN MG: 2 INJECTION INTRAMUSCULAR; INTRAVENOUS at 07:25

## 2020-07-12 RX ADMIN — Medication PRN MLS/HR: at 21:17

## 2020-07-12 RX ADMIN — SODIUM CHLORIDE, POTASSIUM CHLORIDE, SODIUM LACTATE AND CALCIUM CHLORIDE SCH MLS/HR: 600; 310; 30; 20 INJECTION, SOLUTION INTRAVENOUS at 16:50

## 2020-07-12 RX ADMIN — Medication PRN MLS/HR: at 21:18

## 2020-07-12 RX ADMIN — SODIUM CHLORIDE, POTASSIUM CHLORIDE, SODIUM LACTATE AND CALCIUM CHLORIDE SCH MLS/HR: 600; 310; 30; 20 INJECTION, SOLUTION INTRAVENOUS at 01:00

## 2020-07-12 RX ADMIN — CALCIUM CARBONATE (ANTACID) CHEW TAB 500 MG SCH MG: 500 CHEW TAB at 05:05

## 2020-07-12 RX ADMIN — ONDANSETRON PRN MG: 2 INJECTION INTRAMUSCULAR; INTRAVENOUS at 21:43

## 2020-07-12 RX ADMIN — SODIUM CHLORIDE, POTASSIUM CHLORIDE, SODIUM LACTATE AND CALCIUM CHLORIDE SCH MLS/HR: 600; 310; 30; 20 INJECTION, SOLUTION INTRAVENOUS at 10:11

## 2020-07-12 RX ADMIN — Medication SCH MLS/HR: at 04:32

## 2020-07-12 RX ADMIN — ACETAMINOPHEN PRN MG: 325 TABLET ORAL at 19:14

## 2020-07-12 RX ADMIN — Medication PRN MLS/HR: at 22:00

## 2020-07-12 NOTE — HISTORY & PHYSICAL EXAMINATION
Prenatal Admit History





- Visit Reason


Visit Reason: Membranes rupture





- Pregnancy


: 3


Parity: 1


Prenatal Care: positive: [facility]


Prenatal Risk/History: positive: Other (CT prior to pregnancy, neg WIL x2)


Pregnancy Complications This Pregnancy: positive: None


Smoking Status: Never smoker





- Mother's Labs


Mother's Blood Type: positive: A


Mother's RH: positive: Positive (sve with sweep: 1.5/70/-2/soft/post Reviewed 

labor precautions and FM monitoring. Reviewed PIH warning s/sx. Pt verbalized 

understanding and agrees to above plan. She denies further questions or concerns

today. Elective induction discussed risks/benefits. Scheduled for  at 0630. 

Consent signed)


GBS: positive: Group B Step Negative


Rubella Status: positive: Immune





- Other Maternal History


Other Maternal History: 





Underwent SVE in clinic on 2020 and was 1.5/70/-2 station


Presented to triage with gross LOF at 23:30 on 2020


Initial SVE was 2/70/-2. Possible meconium staining; appears clear at present, 


  


Initial U/S: @ 6.6wks gestation c/w LMP dating.


A pos/Rubella immune


Gentic testing: Quad screen negative


FAS: WNL with the exception of  profile/nasal bone suboptimally visualized. 

Placenta is anterior, no previa (was low lying, but resolved on 3/6 f/u). 3VC. 

MEGHANN WNL. Size c/w dating. F/u


Glucola: 109 (Substitution for 1hr GTT- This value is one hour after an observed

ingestion of 30 standard jellybeans (per NCBI))


TDAP : declined


CBC at 28wks: 29.4/8.3- Iron infusion ordered ; will repeat 4 weeks


GBS & GC/CT-neg/neg


HSV: Reports self. Acyclovir needed for prophylaxis. 


Breast pump Rx: reports already has


MOD: Anticipate . Partner Ricardo. Baby BOY- Pawel. 


pp contraception: partner to have vasectomy. Hematology consult PP for chronic 

iron deficiency despite oral supp.





Meds/Allgy





- Home Medications


Home Medications: 


                                Ambulatory Orders











 Medication  Instructions  Recorded  Confirmed


 


Docusate Sodium 100 mg PO DAILY #30 capsule 20 


 


Naproxen 375 mg PO BID #20 tablet 20 














- Allergies


Allergies/Adverse Reactions: 


                                    Allergies











Allergy/AdvReac Type Severity Reaction Status Date / Time


 


gluten AdvReac  Unknown Verified 20 15:17


 


latex AdvReac  Itching Verified 20 16:54


 


Milk Containing Products AdvReac  Unknown Verified 20 15:17














Review of Systems





- Other Findings


Other Findings: 


As per HPI, otherwise remaining systems are negative. 





Physical





- Abdominal Exam


Vital Signs: 





                                        











Temp Pulse Resp BP Pulse Ox


 


 98.1 F   117 H  18   118/78   100 


 


 20 00:35  20 23:30  20 23:30  20 23:30  20 23:30











Contraction Frequency (min/apart): Q4-5 at admit


Contraction Intensity: positive: Moderate





- Fetal Monitoring


Fetal Heart Rate Baseline: 125 mod andres 15x15 accels no decels


Fetal Strip Review: positive: Category I





- Presentation


Presentation: positive: Vertex





- Vaginal Exam


Membranes: positive: Membranes ruptured


Dilation (in cm): 2


Effacement (%): 70


Station: positive: -2


Cervical Position: positive: Midposition





- Other Notes


Labor Progress Note/Additional Text: 





per RN exam





Plan for Labor





- Plan For Labor


Plan for Labor: 


24 yo  at 39+4 wga admitted with SROM/PROM


SROM: 





Patient desires low intervention delivery


Counseled that we can expectantly manage for 4-5 hours and then our strong 

recommendation would be to augment with pitocin in the absence of significant 

cervical change





FWB: Vertex, GBS neg, appropriate growth, Cat I tracing


-unclear meconium status


-No RPR on record, ordered this am


-cEFM





PAIN: Epidural as desired


-Fentanyl 50 mcg Q1h to max of 4 doses and not to be given after 7 cm





ENDO: Patient reports hx of hyperthyroidism


-Review of records show normal thyroid function


-Not on medications at current





Anticipate

## 2020-07-12 NOTE — DELIVERY NOTE
Delivery Note





- Labor


Labor: positive: Augmented by oxytocin





- Infant Delivery Method


Infant Delivery Method: positive: Spontaneous vaginal delivery





- Cervical Ripening Method


Cervical Ripening Method: positive: Misoprostil





- Birth Presentation


Birth Presentation: positive: Vertex





- Nuchal Cord


Nuchal Cord: positive: None





- Anesthetic


Anesthetic Type: 





- Amniotic Fluid Description


Amniotic Fluid Description: positive: Light meconium





- Episiotomy Type


Episiotomy Type: positive: None





- Laceration


Laceration: positive: None





- 


: positive: Placed in direct skin contact with mother, Suctioned, Bulb 

syringe


[place] sex: positive: Male





- Cord


Cord: positive: 3 vessels





- Placenta


Placenta: positive: Expressed





- Estimated Blood Loss


Estimated Blood Loss (in cc): 300





- Post Delivery Events


Post Delivery Events: positive: Hemorrhage





- Delivery Comments (Free Text/Narrative)


Delivery Comments (Free Text/Narrative): 





STAGE: Patient is a 26 yo  admitted at 39+4 wga for SROM.





Patient presented to triage with gross LOF at 23:30 on 2020. Initial SVE 

was 2/70/-2. Light meconium staining.  GBS negative.  Patient progressed to 3 cm

without augmentation and then was started on pitocin for augmentation. She 

reached a max dose of 16 mU/min without significant cervical change. Pitocin was

discontinued. She was rested for 1 hour and then was given a single dose of 

misoprostol 50 mcg BC x1. She began blair spontaneously.  Epidural for 

pain management. At approximately 20:30, she was 5/C/-1 station. Progressed to 

complete at 20:48. cat I tracing throughout Stage I. Transient mild range blood 

pressures while pushing. 





STAGE II:  Patient started pushing at 20:57. She pushed well for 14 minutes to 

deliver a viable male infant at 21:11.   He presented in direct OA and rotated 

to LIZY with left shoulder anterior. Delivered to maternal abdomen. No nuchal 

cord. Cord clamped x2 at 1 minute of life. Brought to warmer by Pediatrics fur 

further assessment. Apgars were 7/9.   





STAGE III: Placenta delivered at 21:26 with manual expression. It was examined 

and found to be intact. Patient had brisk bleeding after delivery, relieved with

bimanual massage, manual exploration of the uterus, pitocin, misoprostol 600 mcg

BC x1 and hemabate 0.25 mg IM x1. She will be given cefazolin 2 g IV x1 post 

partum for ppx given manual exploration of the uterus. Perineum was examined and

she did not have any lacerations. Total EBL was 300 cc.

## 2020-07-12 NOTE — PROVIDER PROGRESS NOTE
Subjective





- Prog Note Date


Prog Note Date: 20


Prog Note Time: 09:51





- Subjective


Subjective: 





Patient made minimal change in cervical dilation over 3 hours of observation. 


Started on pitocin for augmention. Current dose 7 mU/min


Epidural placed at approximately 6:30 am. Currently comfortable





Objective





- Vital Signs/Intake & Output


Reviewed Vital Signs: Yes


Vital Signs: 


91  16  115/82


Intake & Output: 


                                 Intake & Output











 07/09/20 07/10/20 07/11/20 07/12/20





 23:59 23:59 23:59 23:59


 


Output Total    170


 


Balance    -170














- Objective


General Appearance: positive: No acute distress


Respiratory: positive: No respiratory distress


Cardiovascular: positive: Other (reg rate)


Abdomen: positive: Non-tender (gravid, S&NT)


Skin: positive: Color nml, Warm


Extremities: positive: Non-tender, No pedal edema, Other (epidural in place, 

limited mobility)


Neurologic/Psychiatric: positive: Oriented x3


Comments/Other: 





SVE 3/80/high


Fetal head sitting high on pelvic bone, c/w posterio positioning





- Lab Results


Fish Bones: 


                                 20 01:10





Other Labs: 


                               Lab Results x24hrs











  20 Range/Units





  01:10 


 


WBC  11.3 H  (4.8-10.8)  x10^3/uL


 


RBC  4.09 L  (4.20-5.40)  10^6/uL


 


Hgb  9.9 L  (12.0-16.0)  g/dL


 


Hct  33.6 L  (37.0-47.0)  %


 


MCV  82.2  (81.0-99.0)  fL


 


MCH  24.2 L  (27.0-31.0)  pg


 


MCHC  29.5 L  (32.0-36.0)  g/dL


 


Plt Count  285  (130-450)  10^3/uL


 


MPV  10.1  (7.9-10.8)  fL


 


Neut # (Auto)  8.1 H  (1.5-6.6)  10^3/uL


 


Lymph # (Auto)  2.2  (1.5-3.5)  10^3/uL


 


Mono # (Auto)  0.8  (0.0-1.0)  10^3/uL


 


Eos # (Auto)  0.1  (0.0-0.7)  10^3/uL


 


Baso # (Auto)  0.0  (0.0-0.1)  10^3/uL


 


Absolute Nucleated RBC  0.00  x10^3/uL


 


Nucleated RBC %  0.0  /100WBC


 


Manual Slide Review  Indicated  


 


Platelet Estimate  NORMAL (130-450,000)  (NORMAL)  


 


Platelet Morphology  NORMAL APPEARANCE  (NORMAL)  


 


RBC Morph Micro Appear  NORMAL APPEARANCE  (NORMAL)  














- Other Results/Comments


Other Results/Comments: 





 mod anrdes 15x15 accels no decel


TOCO: Q3-5 min





Assessment/Plan





- Problem List


(1) SROM (spontaneous rupture of membranes)


Impression: 


Currently on pitocin for augmentation. 


Most recent dose 10 mU/min


No significant change in SVE


Recommend position change to ameliorate posterior positioning of fetus


GBS neg


Cat I tracing





Epidural in place. 





Patient has period of tachycardia associated with provocation of anxiety


Reviewed Valsalva maneuvers to reduce heart rate


Has had extensive Cardiology eval including normal echo this pregnancy


Reviewed chart notes- thyroid dysfunction dx by naturopath and managed with 

supplements


TFTs have been wnl this pregnancy without intervention





RPR drawn this am





Reviewed questionable meconium status with pediatrics


Likely prolonged rupture of membranes given slow progess of labor course


Pediatrics plans to be present for delivery. 





Cont with augmentation of labor


Anticipate

## 2020-07-12 NOTE — PROVIDER PROGRESS NOTE
Subjective





- Prog Note Date


Prog Note Date: 07/12/20


Prog Note Time: 20:38





- Subjective


Subjective: 





Patient has been uncomfortable with contractions. Last VE was about 2 hours 

prior to current exam and was relatively unchanged. Patient has had pitocin to 

16 mU/min, was rested for an hour and then received misoprostol 50 mcg BC x1. 

She was due for her seocnd dose about an hour ago but has been blair 

frequently and painfully since then. Reports pain in the LLQ only with 

contraction. No bloody show. Fluid continues to run clear. Has received acet

aminophen 650 mg about 1 hour ago. No febrile temps. 





Objective





- Vital Signs/Intake & Output


Vital Signs: 


137/95  103  100% 37.2


Intake & Output: 


                                 Intake & Output











 07/09/20 07/10/20 07/11/20 07/12/20





 23:59 23:59 23:59 23:59


 


Intake Total    2004.6


 


Output Total    1645


 


Balance    359.6














- Objective


General Appearance: positive: Moderate distress, Other (with contractions)


Respiratory: positive: No respiratory distress


Cardiovascular: positive: Other (mild tachycardia. Has tachycardia at baseline)


Abdomen: positive: Non-tender, Other (gravid, S&NT. No point tenderness or TTP 

in LLQ)


Skin: positive: Color nml


Extremities: positive: No pedal edema, Other (s/p epidural)


Neurologic/Psychiatric: positive: Oriented x3


Comments/Other: 





5/90/-1





- Lab Results


Fish Bones: 


                                 07/12/20 01:10





Other Labs: 


                               Lab Results x24hrs











  07/12/20 Range/Units





  01:10 


 


WBC  11.3 H  (4.8-10.8)  x10^3/uL


 


RBC  4.09 L  (4.20-5.40)  10^6/uL


 


Hgb  9.9 L  (12.0-16.0)  g/dL


 


Hct  33.6 L  (37.0-47.0)  %


 


MCV  82.2  (81.0-99.0)  fL


 


MCH  24.2 L  (27.0-31.0)  pg


 


MCHC  29.5 L  (32.0-36.0)  g/dL


 


Plt Count  285  (130-450)  10^3/uL


 


MPV  10.1  (7.9-10.8)  fL


 


Neut # (Auto)  8.1 H  (1.5-6.6)  10^3/uL


 


Lymph # (Auto)  2.2  (1.5-3.5)  10^3/uL


 


Mono # (Auto)  0.8  (0.0-1.0)  10^3/uL


 


Eos # (Auto)  0.1  (0.0-0.7)  10^3/uL


 


Baso # (Auto)  0.0  (0.0-0.1)  10^3/uL


 


Absolute Nucleated RBC  0.00  x10^3/uL


 


Nucleated RBC %  0.0  /100WBC


 


Manual Slide Review  Indicated  


 


Platelet Estimate  NORMAL (130-450,000)  (NORMAL)  


 


Platelet Morphology  NORMAL APPEARANCE  (NORMAL)  


 


RBC Morph Micro Appear  NORMAL APPEARANCE  (NORMAL)  














Assessment/Plan





- Problem List


(1) SROM (spontaneous rupture of membranes)


Impression: 


IOL for SROM: 





-S/p pitocin 16 mU/min; discontinued


- Had miso 50 mcg x1


Now blair with cervical change


Expt management





Cat I tracing





Calling anesthesia to adjust epidural

## 2020-07-12 NOTE — ANESTHESIA
Pre-Anesthesia VS, & Labs





- Diagnosis





labor





- Procedure





labor epidural


Vital Signs: 





                                        











Temp Pulse Resp BP Pulse Ox


 


 36.7 C   117 H  18   118/78   100 


 


 07/12/20 00:35  07/11/20 23:30  07/11/20 23:30  07/11/20 23:30  07/11/20 23:30














                                        





Height                           5 ft 4 in


Weight (kg)                      77.111 kg


Body Mass Index                  21.4











- NPO


Other (clears)





- Pregnancy


Is Patient Pregnant?: Yes





- Lab Results


Current Lab Results: 





Laboratory Tests





07/12/20 01:10: WBC 11.3 H, RBC 4.09 L, Hgb 9.9 L, Hct 33.6 L, MCV 82.2, MCH 

24.2 L, MCHC 29.5 L, Plt Count 285, MPV 10.1, Neut # (Auto) 8.1 H, Lymph # 

(Auto) 2.2, Mono # (Auto) 0.8, Eos # (Auto) 0.1, Baso # (Auto) 0.0, Absolute 

Nucleated RBC 0.00, Nucleated RBC % 0.0, Manual Slide Review Indicated, Platelet

Estimate NORMAL (130-450,000), Platelet Morphology NORMAL APPEARANCE, RBC Morph 

Micro Appear NORMAL APPEARANCE








Fish Bones: 


                                 07/12/20 01:10








Home Medications and Allergies





Active Medications





Calcium Carbonate/Glycine (Tums)  500 mg PO BID [facility]


   Last Admin: 07/12/20 05:05 Dose:  500 mg


   Documented by: 


Carboprost Tromethamine (Hemabate)  250 mcg IM Q15M PRN


   PRN Reason: Step 4: Hemorrhage protocol


   Stop: 07/14/20 00:09


Diphenhydramine HCl (Benadryl Inj)  12.5 - 25 mg IVP Q6HR PRN


   PRN Reason: ITCHING


Ephedrine Sulfate ()  5 mg IVP Q5M PRN


   PRN Reason: For SBP<100;give until SBP>100


Fentanyl (Fentanyl)  50 mcg IVP Q1H PRN


   PRN Reason: PAIN


Lactated Ringer's (Lr)  1,000 mls @ 150 mls/hr IV .Q6H40M [facility]


   Last Admin: 07/12/20 01:00 Dose:  150 mls/hr


   Documented by: 


Oxytocin/Sodium Chloride (Pitocin/Sodium Chloride)  500 mls @ 999 mls/hr IV PRN 

PRN; Protocol


   PRN Reason: POST-PARTUM HEMORR PREVENTION


   Stop: 07/14/20 00:09


Tranexamic Acid 1,000 mg/ (Sodium Chloride)  110 mls @ 660 mls/hr IV ONCE PRN


   PRN Reason: EBL >1200mL and within 3hr


   Stop: 07/14/20 00:09


Oxytocin/Sodium Chloride (Pitocin/Sodium Chloride)  500 mls @ 1 mls/hr IV TITR 

OMAYRA; Protocol


   Last Admin: 07/12/20 04:32 Dose:  1 milliunit/min, 1 mls/hr


   Documented by: 


Lactated Ringer's (Lr)  500 mls @ 999 mls/hr IV ONCE ONE


   Stop: 07/12/20 06:45


Ropivacaine (Naropin 0.2%)  200 mg in 100 mls @ 0 mls/hr EP PRN PRN; Protocol


   PRN Reason: PAIN


Lidocaine HCl (Xylocaine-Mpf 1% Vial)  30 ml ID ONCE PRN


   PRN Reason: PERINEAL REPAIR


   Stop: 07/14/20 00:09


Methylergonovine Maleate (Methergine Inj)  0.2 mg IM ONCE PRN


   PRN Reason: Step 2: Hemorrhage protocol


   Stop: 07/14/20 00:09


Metoclopramide HCl (Reglan Inj)  10 mg IVP Q6HR PRN


   PRN Reason: Nausea / Vomiting


Misoprostol (Cytotec)  800 mcg BC ONCE PRN


   PRN Reason: Step 3: Hemorrhage protocol


   Stop: 07/14/20 00:09


Nalbuphine HCl (Nubain)  2.5 - 5 mg IVP Q4H PRN


   PRN Reason: ITCHING


Naloxone HCl (Narcan)  0.1 mg IVP Q2M PRN


   PRN Reason: RR<8


Ondansetron HCl (Zofran Inj)  4 mg IVP Q4H PRN


   PRN Reason: Nausea / Vomiting


Ondansetron HCl (Zofran Inj)  4 mg IVP Q6HR PRN


   PRN Reason: Nausea / Vomiting


Oxytocin (Pitocin)  10 unit IM ONCE PRN


   PRN Reason: Step one: If no IV access


   Stop: 07/14/20 00:09


Sodium Chloride (Normal Saline Flush 0.9%)  10 ml IVP PRN PRN


   PRN Reason: AS NEEDED PER PROVIDER ORDERS


   Last Admin: 07/12/20 01:00 Dose:  10 ml


   Documented by: 








Allergies/Adverse Reactions: 


                                    Allergies











Allergy/AdvReac Type Severity Reaction Status Date / Time


 


gluten AdvReac  Unknown Verified 07/06/20 15:17


 


latex AdvReac  Itching Verified 01/17/20 16:54


 


Milk Containing Products AdvReac  Unknown Verified 07/06/20 15:17














Anes History & Medical History





- Anesthetic History


Anesthesia Complications: reports: No previous complications





- Medical History


Cardiovascular: reports: None


Pulmonary: reports: None


Gastrointestinal: reports: None


Urinary: reports: None


Neuro: reports: None


Musculoskeletal: reports: None


Endocrine/Autoimmune: reports: None


Blood Disorders: reports: None


Skin: reports: None


Smoking Status: Never smoker





- Surgical History


Gynecologic: Other (for endometriosis)





Exam


General: Alert


Dental: WNL


Mouth Opening: Greater than 4 Fingerbreadths


Neck Mobility: Normal


Mallampati classification: II


Thyromental Distance: greater than 6 cm


Respiratory: Lungs clear


Cardiovascular: Regular rate





Plan


Anesthesia Type: Epidural


Consent for Procedure(s) Verified and Reviewed: Yes


Code Status: Attempt Resuscitation


ASA classification: 2-Mild systemic disease


Is this case an emergency?: No

## 2020-07-12 NOTE — CONSULTATION NOTE
Consultation Report: 





CADD pump:








Called to remove air from line in CADD pump discovered a leaking bag of local. 

Replaced and restarted infusion holding continuos rate for patients request.

## 2020-07-13 RX ADMIN — CALCIUM CARBONATE (ANTACID) CHEW TAB 500 MG SCH: 500 CHEW TAB at 17:47

## 2020-07-13 RX ADMIN — IBUPROFEN SCH MG: 600 TABLET, FILM COATED ORAL at 21:06

## 2020-07-13 RX ADMIN — IBUPROFEN SCH MG: 600 TABLET, FILM COATED ORAL at 00:59

## 2020-07-13 RX ADMIN — ACETAMINOPHEN PRN MG: 325 TABLET ORAL at 08:48

## 2020-07-13 RX ADMIN — Medication SCH: at 17:48

## 2020-07-13 RX ADMIN — ACETAMINOPHEN SCH MG: 500 TABLET ORAL at 15:26

## 2020-07-13 RX ADMIN — SODIUM CHLORIDE, POTASSIUM CHLORIDE, SODIUM LACTATE AND CALCIUM CHLORIDE SCH: 600; 310; 30; 20 INJECTION, SOLUTION INTRAVENOUS at 17:47

## 2020-07-13 RX ADMIN — IBUPROFEN SCH MG: 600 TABLET, FILM COATED ORAL at 08:48

## 2020-07-13 RX ADMIN — SERTRALINE HYDROCHLORIDE SCH: 50 TABLET ORAL at 17:50

## 2020-07-13 RX ADMIN — IBUPROFEN SCH MG: 600 TABLET, FILM COATED ORAL at 15:25

## 2020-07-13 NOTE — PROVIDER PROGRESS NOTE
Subjective





- Prog Note Date


Prog Note Date: 07/13/20


Prog Note Time: 08:45





- Subjective


Subjective: 





Patient is doing well. Up and ambulating. Tolerating po. Voiding. Minimal pain 

and bleeding has been light to moderate. 


BPs wnl postpartum and PIH labs normal. 





Objective





- Vital Signs/Intake & Output


Vital Signs: 


                                Vital Signs x48h











  Temp Pulse Resp BP Pulse Ox


 


 07/13/20 15:22  97.7 F  83  18  101/72  100


 


 07/13/20 12:05  97.9 F  88   111/75  100











Intake & Output: 


                                 Intake & Output











 07/10/20 07/11/20 07/12/20 07/13/20





 23:59 23:59 23:59 23:59


 


Intake Total   2504.6 1000


 


Output Total   2545 250


 


Balance   -40.4 750














- Objective


General Appearance: positive: No acute distress


Respiratory: positive: No respiratory distress, Breath sounds nml


Cardiovascular: positive: Regular rate & rhythm


Abdomen: positive: Non-tender, Other (S&NT/ND. FF below umbi)


Extremities: positive: Non-tender, No pedal edema


Neurologic/Psychiatric: positive: Oriented x3





- Lab Results


Fish Bones: 


                                 07/12/20 22:02





Other Labs: 


                               Lab Results x24hrs











  07/12/20 07/12/20 07/12/20 Range/Units





  22:02 22:02 22:02 


 


WBC   21.0 H   (4.8-10.8)  x10^3/uL


 


RBC   4.48   (4.20-5.40)  10^6/uL


 


Hgb   10.9 L   (12.0-16.0)  g/dL


 


Hct   37.3   (37.0-47.0)  %


 


MCV   83.3   (81.0-99.0)  fL


 


MCH   24.3 L   (27.0-31.0)  pg


 


MCHC   29.2 L   (32.0-36.0)  g/dL


 


RDW      (12.0-15.0)  %


 


Plt Count   297   (130-450)  10^3/uL


 


MPV   9.9   (7.9-10.8)  fL


 


Neut # (Auto)   17.7 H   (1.5-6.6)  10^3/uL


 


Lymph # (Auto)   2.1   (1.5-3.5)  10^3/uL


 


Mono # (Auto)   1.0   (0.0-1.0)  10^3/uL


 


Eos # (Auto)   0.0   (0.0-0.7)  10^3/uL


 


Baso # (Auto)   0.1   (0.0-0.1)  10^3/uL


 


Absolute Nucleated RBC   0.00   x10^3/uL


 


Nucleated RBC %   0.0   /100WBC


 


Manual Slide Review   Indicated   


 


Platelet Estimate   NORMAL (130-450,000)   (NORMAL)  


 


Platelet Morphology   NORMAL APPEARANCE   (NORMAL)  


 


RBC Morph Micro Appear   3+ POIKILOCYTOSIS   (NORMAL)  


 


Uric Acid  4.4    (2.6-7.2)  mg/dL


 


AST  27    (10-42)  IU/L


 


Lactate Dehydrogenase    175  ()  IU/L














Assessment/Plan





- Problem List


(1) Vaginal delivery


Impression: 


Doing well in pp period


Cont with routine pp care


Anticipate 48 hrs observation for prolonged rupture of membranes. 


Likely DC home Wed am vs Tuesday pm per Pediatric care plan


Inpatient care

## 2020-07-14 VITALS — SYSTOLIC BLOOD PRESSURE: 120 MMHG | DIASTOLIC BLOOD PRESSURE: 87 MMHG

## 2020-07-14 RX ADMIN — IBUPROFEN SCH MG: 600 TABLET, FILM COATED ORAL at 16:31

## 2020-07-14 RX ADMIN — ACETAMINOPHEN SCH MG: 500 TABLET ORAL at 02:11

## 2020-07-14 RX ADMIN — ACETAMINOPHEN SCH MG: 500 TABLET ORAL at 09:36

## 2020-07-14 RX ADMIN — ACETAMINOPHEN PRN MG: 325 TABLET ORAL at 16:34

## 2020-07-14 RX ADMIN — SERTRALINE HYDROCHLORIDE SCH: 50 TABLET ORAL at 11:28

## 2020-07-14 RX ADMIN — IBUPROFEN SCH MG: 600 TABLET, FILM COATED ORAL at 09:36

## 2020-07-14 NOTE — LABOR FLOWSHEET
===================================

Labor Flowsheet

===================================

Datetime Report Generated by CPN: 07/14/2020 21:51

   

   

===========================

Datetime: 07/13/2020 04:43

===========================

   

   

===================================

VITAL SIGNS

===================================

   

 NBP Sys/Nani/Mean (mmHg):  127

:  83

:  93

 Pulse:  105

 LaborFlag:  Labor

   

===========================

Datetime: 07/12/2020 22:38

===========================

   

 Patient Care Comments:  135/85 BP taken manually.  Pt. shivering.

   

===========================

Datetime: 07/12/2020 22:10

===========================

   

 SpO2 (%):  100

   

===========================

Datetime: 07/12/2020 21:27

===========================

   

 Medication Comments:  hemabate given

   

===========================

Datetime: 07/12/2020 21:17

===========================

   

   

===================================

MEDICATIONS

===================================

   

 Pitocin (milliunits):  Decreased to @ 999

 Cervical Ripening Agents:  Cytotec @

   

===========================

Datetime: 07/12/2020 21:07

===========================

   

   

===================================

UTERINE ACTIVITY

===================================

   

 Monitor Mode:  External

 Frequency (min):  1.5-3

 Quality:  Strong

 Duration (sec):  60

 Pattern:  Normal: <= 5 Contractions in 10 Minutes

 Resting Tone (Palpate):  Relaxed

   

===================================

FETAL ASSESSMENT A

===================================

   

 Monitor Mode:  External US

 FHR Baseline Changes:  No Baseline Change

 Variability:  Moderate 6-25 bpm

 Accelerations:  15X15

 Decelerations:  Early

 Comments:  maternal heart recorded due to pt. positioning and pushing.

   

===========================

Datetime: 07/12/2020 21:00

===========================

   

 Pain Presence:  None/Denies

 Anesthesia Comments:  bolus effective. no left lower abd pain.

   

===========================

Datetime: 07/12/2020 20:57

===========================

   

   

===================================

STAGE 2

===================================

   

 Stage 2 Comments:  pushing

   

===========================

Datetime: 07/12/2020 20:48

===========================

   

   

===================================

VAGINAL EXAM

===================================

   

 Dilatation (cm):  9.5

 Effacement (%):  100

 Station:  2

 Exam by:  Dr. Henley

   

===========================

Datetime: 07/12/2020 20:46

===========================

   

   

===================================

COMMUNICATION

===================================

   

 Communication:  Call/Page Placed to Provider

 Communication Comments:  pt. feels the urge to push

   

===========================

Datetime: 07/12/2020 20:43

===========================

   

 Monitor Interventions for UA:  [employer] Adjusted

   

===========================

Datetime: 07/12/2020 20:39

===========================

   

 Patient Position/Activity:  Left Extreme

   

===========================

Datetime: 07/12/2020 20:30

===========================

   

 Category:  Category I

   

===========================

Datetime: 07/12/2020 20:15

===========================

   

 Vaginal Exam Comments:  stretchy

   

===========================

Datetime: 07/12/2020 20:00

===========================

   

   

===================================

PAIN

===================================

   

 Pain Scale:  6

 Pain Type:  Burning; Cramping

 Pain Location:  Abdomen; Back; Left Groin

 Pain Relief Measures:  Epidural Given; Comfort Measures

 Pain Coping:  Breathing Through Contractions

 Pain Assessment Comments:  contractions are more strong per pt.

 Comfort Measures:  Family Support

   

===========================

Datetime: 07/12/2020 19:47

===========================

   

 Temperature (C):  37.2

   

===========================

Datetime: 07/12/2020 19:30

===========================

   

 Contraction Comments:  Pt. having to  rail and concentrate on contractions now. Pt. also reports
 ctx being stronger.

   

===========================

Datetime: 07/12/2020 19:02

===========================

   

 Respirations:  17

   

===========================

Datetime: 07/12/2020 19:00

===========================

   

 FHR Baseline Rate :  140

 Oxygen Method:  Room Air

   

===========================

Datetime: 07/12/2020 18:00

===========================

   

 Notification Reason:  Labor Status

   

===========================

Datetime: 07/12/2020 17:55

===========================

   

 Cervix, Consistency:  Soft

 Cervix, Position:  Midposition

   

===========================

Datetime: 07/12/2020 13:21

===========================

   

 Vaginal Bleeding:  None

   

===========================

Datetime: 07/12/2020 13:07

===========================

   

 Anesthesia Level Check:  T10- Umbilicus

   

===========================

Datetime: 07/12/2020 10:05

===========================

   

 I/O Interventions:  Clear Liquids Given

   

===========================

Datetime: 07/12/2020 09:48

===========================

   

 Hygiene:  Peripad Changed

   

===========================

Datetime: 07/12/2020 07:28

===========================

   

 Antiemetics/Antacids:  Zofran (mg) @ 4

   

===========================

Datetime: 07/12/2020 07:15

===========================

   

 Monitor Interventions for FHR:  Ultrasound Adjusted

   

===========================

Datetime: 07/12/2020 06:36

===========================

   

 Pitocin Checklist:  At Least 1 Acceleration of 15 bpm x 15 Seconds in 30 Minutes or Adequate Variabi
lity; No More than 1 Late Deceleration Occurred in Past 30 Minutes; No More than 2 Variable Decelerat
ions > 60 Seconds in Duration and decreasing >60 bpm in 30 minutes; No More than 5 Uterine Contractio
ns in 10 Minutes for any 20 Minute Interval

   

===========================

Datetime: 07/12/2020 05:44

===========================

   

 Epidural Procedure:  Test Dose

   

===========================

Datetime: 07/12/2020 05:34

===========================

   

   

===================================

ANESTHESIA

===================================

   

 Epidural Positioning:  Sitting

   

===========================

Datetime: 07/12/2020 04:02

===========================

   

   

===================================

TEACHING

===================================

   

 Instructional Method:  Verbal

 Plan of Care:  Plan of Care Discussed

 Labor/Induction:  Labor Stages; Cervical Ripening; Augmentation; Meconium

 Pain Management:  IV Narcotics; Epidural; Pain Scale/Goals; Comfort Measures

 Pregnancy Related:  Common Discomforts of Pregnancy; Maternal Physical Changes; Activity and Rest

 Teaching Comments:  pt. very concerned about starting pitocin. pro's and con's gone over.  Discussed
 POC regarding pitocin usage.  Discussed epidural placement.  Pt. would like to wait on epidural and 
start on a low dose pit first.

   

===========================

Datetime: 07/12/2020 01:00

===========================

   

   

===================================

PATIENT CARE

===================================

   

 IV/Blood Work:  IV Started; IV Infusing per Order

## 2020-07-14 NOTE — DISCHARGE PLAN
Discharge Plan


Problem Reviewed?: Yes


Disposition: 01 Home, Self Care


Condition: Good


Diet: Regular


Activity Restrictions: Additional Comments (Nothing in the vagina for 6 weeks: 

No intercourse, tampons, douching Call for:    -Fever greater than 100.5    -

Pain that does not improve with pain medication    -Heavy bleeding in which you 

are soaking a pad an hour for 2 hours in a row)


Weight Bearing: Full Weight


Additional Instructions or Follow Up instructions: 


Ibuprofen 600 mg by mouth every 6 hours as needed for pain


Acetaminophen 500-1000 mg by mouth every 8 hours as needed for pain


Docusate 100-200 mg by mouth twice a day as needed for constipation


No Smoking: If you smoke, Please STOP!  Call [phone] for help.


Follow-up with: 


Jes Bryant ARNP [Primary Care Provider] -

## 2020-07-15 ENCOUNTER — HOSPITAL ENCOUNTER (OUTPATIENT)
Dept: HOSPITAL 76 - LAB.R | Age: 25
Discharge: HOME | End: 2020-07-15
Attending: RADIOLOGY
Payer: COMMERCIAL

## 2020-07-15 DIAGNOSIS — N39.0: Primary | ICD-10-CM

## 2020-07-15 LAB
BILIRUB UR QL CFM: NEGATIVE
CLARITY UR REFRACT.AUTO: (no result)
GLUCOSE UR QL STRIP.AUTO: NEGATIVE MG/DL
KETONES UR QL STRIP.AUTO: (no result) MG/DL
NITRITE UR QL STRIP.AUTO: NEGATIVE
PH UR STRIP.AUTO: 7 PH (ref 5–7.5)
PROT UR STRIP.AUTO-MCNC: 100 MG/DL
RBC # UR STRIP.AUTO: (no result) /UL
RBC # URNS HPF: (no result) /HPF (ref 0–5)
SP GR UR STRIP.AUTO: 1.02 (ref 1–1.03)
SQUAMOUS URNS QL MICRO: (no result)
UROBILINOGEN UR QL STRIP.AUTO: 2 E.U./DL
UROBILINOGEN UR STRIP.AUTO-MCNC: NEGATIVE MG/DL

## 2020-07-15 PROCEDURE — 87086 URINE CULTURE/COLONY COUNT: CPT

## 2020-07-15 PROCEDURE — 81001 URINALYSIS AUTO W/SCOPE: CPT

## 2020-07-18 NOTE — PROVIDER PROGRESS NOTE
- HPI


Chief Complaint: Other ( pt presents to triage at 38+5 weeks gestation 

complaining of leakage of fluid since appx 0900 this morning with contractions 

that "come and go." No VB, +FM. Hx of 7 hour labor with first baby. Nitrazine 

negative)


Current Pregnancy: 


                                                               Current Pregnancy





EDU                              07/15/20


Gestation                        38 Weeks and 5 Days


                          2


Para                             1





Vital Signs











Temperature  99.1 F   20 14:20


 


Heart Rate  112 H  20 14:20


 


Respiratory Rate  18   20 14:20


 


Blood Pressure  123/83 H  20 14:20


 


O2 Saturation  100   20 14:20




















Temperature  99.1 F   20 14:20


 


Heart Rate  112 H  20 14:20


 


Respiratory Rate  18   20 14:20


 


Blood Pressure  123/83 H  20 14:20


 


O2 Saturation  100   20 14:20














- Procedures


OB Procedure Performed: NST


Diagnosis/Indication for NST: Other (Labor assessment/Rule out rupture)


NST Procedure: 


 mod andres 15x15 accels no decels


TOCO: occ


Findings: 





Neg ROM+


Cat I tracing





- Plan


Plan: 





24 yo  at 38+5 wga here for rule out rupture of membranes





Neg ROM+ and negative nitrazine


NST Cat I tracing





Patient discharged to home with warning signs


Routine OB follow-up





DX: False labor

## 2020-07-18 NOTE — DISCHARGE SUMMARY
Discharge Summary


Admit Date: 20


Discharge Date: 20


Discharging Provider: Lory


Code Status: Attempt Resuscitation


Condition at Discharge: Good


Discharge Disposition: 01 Home, Self Care





- DIAGNOSES


Admission Diagnoses: 





IUP at 39+4 wga


Spontaneous rupture of membranes


Light meconium staining


Discharge Diagnoses with Status of Each Condition: 





Same and delivery of term gestation





- HPI


History of Present Illness: 





Patient is a 26 yo  admitted at 39+4 wga for SROM.





Patient presented to triage with gross LOF at 23:30 on 2020. Initial SVE 

was 2/70/-2. Light meconium staining. Admitted for labor/augmentation of labor. 





- HOSPITAL COURSE


Hospital Course: 


 Patient is a 26 yo  admitted at 39+4 wga for SROM.





Patient presented to triage with gross LOF at 23:30 on 2020. Initial SVE 

was 2/70/-2. Light meconium staining.  GBS negative.  Patient progressed to 3 cm

without augmentation and then was started on pitocin for augmentation. She reac

hed a max dose of 16 mU/min without significant cervical change. Pitocin was 

discontinued. She was rested for 1 hour and then was given a single dose of 

misoprostol 50 mcg BC x1. She began blair spontaneously.  Epidural for 

pain management. At approximately 20:30, she was 5/C/-1 station. Progressed to 

complete at 20:48. cat I tracing throughout Stage I. Transient mild range blood 

pressures while pushing. 





STAGE II:  Patient started pushing at 20:57. She pushed well for 14 minutes to 

deliver a viable male infant at 21:11.   He presented in direct OA and rotated 

to LIZY with left shoulder anterior. Delivered to maternal abdomen. No nuchal 

cord. Cord clamped x2 at 1 minute of life. Brought to warmer by Pediatrics fur 

further assessment. Apgars were 7/9.   





STAGE III: Placenta delivered at 21:26 with manual expression. It was examined 

and found to be intact. Patient had brisk bleeding after delivery, relieved with

bimanual massage, manual exploration of the uterus, pitocin, misoprostol 600 mcg

BC x1 and hemabate 0.25 mg IM x1. She will be given cefazolin 2 g IV x1 post 

partum for ppx given manual exploration of the uterus. Perineum was examined and

she did not have any lacerations. Total EBL was 300 cc.








Post partum course was unremarkable. Infant observed for additional 24 hours for

prolonged rupture of membranes. 





- ALLERGIES


Allergies/Adverse Reactions: 


                                    Allergies











Allergy/AdvReac Type Severity Reaction Status Date / Time


 


gluten AdvReac  Unknown Verified 20 15:17


 


latex AdvReac  Itching Verified 20 16:54


 


Milk Containing Products AdvReac  Unknown Verified 20 15:17














- MEDICATIONS


Home Medications: 


                                Ambulatory Orders











 Medication  Instructions  Recorded  Confirmed


 


Docusate Sodium 100 mg PO DAILY #30 capsule 20 


 


Naproxen 375 mg PO BID #20 tablet 20 














- LABS


Result Diagrams: 


                                 20 22:02








- FOLLOW UP


Follow Up: 





1 week fu with DILIP Cronin





- TIME SPENT


Time Spent in Discharge (Minutes): 30

## 2020-07-18 NOTE — PROVIDER PROGRESS NOTE
- HPI


Chief Complaint: Labor Check


Current Pregnancy: 


                                                               Current Pregnancy





EDU                              07/15/20


Gestation                        37 Weeks and 1 Days


                          2


Para                             1





Vital Signs











Temperature  99.0 F   20 16:25


 


Heart Rate  117 H  20 16:25


 


Respiratory Rate  18   20 16:25


 


Blood Pressure  128/85 H  20 16:25


 


O2 Saturation  100   20 16:25




















Temperature  99.0 F   20 16:25


 


Heart Rate  117 H  20 16:25


 


Respiratory Rate  18   20 16:25


 


Blood Pressure  128/85 H  20 16:25


 


O2 Saturation  100   20 16:25














- Exam





SVE closed/long/high per RN exam





- Procedures


OB Procedure Performed: NST


Diagnosis/Indication for NST: Other (labor assessment)


NST Procedure: 


 mod andres 15x15 accels no decels


TOCO: irritable


Service Date of procedure: 20


Findings: 





Cat I tracing


Cervical exam closed/long/high





Final DX: false labor








DC to home with warning signs reviewed


Routine OB care

## 2020-08-26 ENCOUNTER — HOSPITAL ENCOUNTER (OUTPATIENT)
Dept: HOSPITAL 76 - LAB.R | Age: 25
Discharge: HOME | End: 2020-08-26
Attending: ADVANCED PRACTICE MIDWIFE
Payer: COMMERCIAL

## 2020-08-26 DIAGNOSIS — N89.8: Primary | ICD-10-CM

## 2020-08-26 LAB
C KRUSEI DNA VAG QL NAA+PROBE: NEGATIVE
CANDIDA DNA VAG QL NAA+PROBE: NEGATIVE
T VAGINALIS RRNA GENITAL QL PROBE: NEGATIVE

## 2020-08-26 PROCEDURE — 87801 DETECT AGNT MULT DNA AMPLI: CPT

## 2020-08-26 PROCEDURE — 87661 TRICHOMONAS VAGINALIS AMPLIF: CPT

## 2020-11-21 ENCOUNTER — HOSPITAL ENCOUNTER (OUTPATIENT)
Dept: HOSPITAL 76 - DI | Age: 25
Discharge: HOME | End: 2020-11-21
Attending: PHYSICIAN ASSISTANT
Payer: COMMERCIAL

## 2020-11-21 DIAGNOSIS — G43.909: Primary | ICD-10-CM

## 2020-11-21 PROCEDURE — 70551 MRI BRAIN STEM W/O DYE: CPT

## 2020-11-23 NOTE — MRI REPORT
PROCEDURE:  Brain W/O

 

INDICATIONS:  MIGRAINE

 

TECHNIQUE:  

Noncontrast axial T1 spin echo, axial T2 fast spin echo, sagittal and axial FLAIR, coronal T2 fast sp
in echo, axial gradient echo, axial diffusion and ADC through the brain.  

 

COMPARISON:  None.

 

FINDINGS:  

Image quality: Mildly compromised by metal artifact from ring that could not be removed on the left..
  

 

CSF Spaces:  Basal cisterns are patent.  No extra-axial fluid collections.  Ventricles are normal in 
size and shape.  

 

Brain:  No intracranial masses or hemorrhage.  Gray/white matter interface is normal.  Brainstem appe
ars normal.  Diffusion-weighted images demonstrate no acute ischemic insult.  No chronic ischemic ins
ults.  Normal intravascular flow voids are present.  

 

Skull and face:  Calvarium has normal marrow signal.  Orbits appear normal.  

 

Sinuses:  Sinuses and mastoids are clear.  

 

IMPRESSION:  

A portion of the left temporal brain parenchyma extending on several simple sequences into the pariet
al and occipital regions with somewhat poorly seen due to metal artifact from an earring that could n
ot be removed by the patient. The study shows no evidence of mass, hemorrhage, or prior ischemic inju
ry. A source of persistent migraines is not identified.

 

Reviewed by: Adrián Pearce MD on 11/23/2020 8:13 AM PST

Approved by: Adrián Pearce MD on 11/23/2020 8:13 AM PST

 

 

Station ID:  [id number]

## 2020-11-27 ENCOUNTER — HOSPITAL ENCOUNTER (OUTPATIENT)
Dept: HOSPITAL 76 - LAB.R | Age: 25
Discharge: HOME | End: 2020-11-27
Attending: FAMILY MEDICINE
Payer: COMMERCIAL

## 2020-11-27 DIAGNOSIS — J06.9: Primary | ICD-10-CM

## 2020-11-27 DIAGNOSIS — Z20.828: ICD-10-CM

## 2020-11-27 PROCEDURE — 87275 INFLUENZA B AG IF: CPT

## 2020-11-27 PROCEDURE — 87276 INFLUENZA A AG IF: CPT

## 2020-12-15 ENCOUNTER — HOSPITAL ENCOUNTER (EMERGENCY)
Dept: HOSPITAL 76 - ED | Age: 25
Discharge: HOME | End: 2020-12-15
Payer: COMMERCIAL

## 2020-12-15 VITALS — DIASTOLIC BLOOD PRESSURE: 77 MMHG | SYSTOLIC BLOOD PRESSURE: 120 MMHG

## 2020-12-15 DIAGNOSIS — D64.9: ICD-10-CM

## 2020-12-15 DIAGNOSIS — N93.9: Primary | ICD-10-CM

## 2020-12-15 LAB
ALBUMIN DIAFP-MCNC: 4.3 G/DL (ref 3.2–5.5)
ALBUMIN/GLOB SERPL: 1.5 {RATIO} (ref 1–2.2)
ALP SERPL-CCNC: 89 IU/L (ref 42–121)
ALT SERPL W P-5'-P-CCNC: 15 IU/L (ref 10–60)
ANION GAP SERPL CALCULATED.4IONS-SCNC: 8 MMOL/L (ref 6–13)
AST SERPL W P-5'-P-CCNC: 15 IU/L (ref 10–42)
BASOPHILS NFR BLD AUTO: 0 10^3/UL (ref 0–0.1)
BASOPHILS NFR BLD AUTO: 0.2 %
BILIRUB BLD-MCNC: 0.8 MG/DL (ref 0.2–1)
BUN SERPL-MCNC: 15 MG/DL (ref 6–20)
CALCIUM UR-MCNC: 9.2 MG/DL (ref 8.5–10.3)
CHLORIDE SERPL-SCNC: 108 MMOL/L (ref 101–111)
CLARITY UR REFRACT.AUTO: CLEAR
CO2 SERPL-SCNC: 25 MMOL/L (ref 21–32)
CREAT SERPLBLD-SCNC: 0.7 MG/DL (ref 0.4–1)
EOSINOPHIL # BLD AUTO: 0.1 10^3/UL (ref 0–0.7)
EOSINOPHIL NFR BLD AUTO: 1.9 %
ERYTHROCYTE [DISTWIDTH] IN BLOOD BY AUTOMATED COUNT: 12.7 % (ref 12–15)
GLOBULIN SER-MCNC: 2.8 G/DL (ref 2.1–4.2)
GLUCOSE SERPL-MCNC: 90 MG/DL (ref 70–100)
GLUCOSE UR QL STRIP.AUTO: NEGATIVE MG/DL
HCG UR QL: NEGATIVE
HGB UR QL STRIP: 12.4 G/DL (ref 12–16)
KETONES UR QL STRIP.AUTO: NEGATIVE MG/DL
LIPASE SERPL-CCNC: 36 U/L (ref 22–51)
LYMPHOCYTES # SPEC AUTO: 1.8 10^3/UL (ref 1.5–3.5)
LYMPHOCYTES NFR BLD AUTO: 33.5 %
MCH RBC QN AUTO: 28.6 PG (ref 27–31)
MCHC RBC AUTO-ENTMCNC: 32.7 G/DL (ref 32–36)
MCV RBC AUTO: 87.5 FL (ref 81–99)
MONOCYTES # BLD AUTO: 0.3 10^3/UL (ref 0–1)
MONOCYTES NFR BLD AUTO: 6.2 %
MUCOUS THREADS URNS QL MICRO: (no result)
NEUTROPHILS # BLD AUTO: 3.1 10^3/UL (ref 1.5–6.6)
NEUTROPHILS # SNV AUTO: 5.3 X10^3/UL (ref 4.8–10.8)
NEUTROPHILS NFR BLD AUTO: 58 %
NITRITE UR QL STRIP.AUTO: NEGATIVE
PDW BLD AUTO: 10 FL (ref 7.9–10.8)
PH UR STRIP.AUTO: 5.5 PH (ref 5–7.5)
PLATELET # BLD: 243 10^3/UL (ref 130–450)
PROT SPEC-MCNC: 7.1 G/DL (ref 6.7–8.2)
PROT UR STRIP.AUTO-MCNC: (no result) MG/DL
RBC # UR STRIP.AUTO: (no result) /UL
RBC # URNS HPF: (no result) /HPF (ref 0–5)
RBC MAR: 4.33 10^6/UL (ref 4.2–5.4)
SODIUM SERPLBLD-SCNC: 141 MMOL/L (ref 135–145)
SP GR UR STRIP.AUTO: >=1.03 (ref 1–1.03)
SQUAMOUS URNS QL MICRO: (no result)
UROBILINOGEN UR QL STRIP.AUTO: (no result) E.U./DL
UROBILINOGEN UR STRIP.AUTO-MCNC: NEGATIVE MG/DL

## 2020-12-15 PROCEDURE — 83690 ASSAY OF LIPASE: CPT

## 2020-12-15 PROCEDURE — 81003 URINALYSIS AUTO W/O SCOPE: CPT

## 2020-12-15 PROCEDURE — 85025 COMPLETE CBC W/AUTO DIFF WBC: CPT

## 2020-12-15 PROCEDURE — 80053 COMPREHEN METABOLIC PANEL: CPT

## 2020-12-15 PROCEDURE — 99284 EMERGENCY DEPT VISIT MOD MDM: CPT

## 2020-12-15 PROCEDURE — 99283 EMERGENCY DEPT VISIT LOW MDM: CPT

## 2020-12-15 PROCEDURE — 81001 URINALYSIS AUTO W/SCOPE: CPT

## 2020-12-15 PROCEDURE — 81025 URINE PREGNANCY TEST: CPT

## 2020-12-15 PROCEDURE — 87086 URINE CULTURE/COLONY COUNT: CPT

## 2020-12-15 PROCEDURE — 36415 COLL VENOUS BLD VENIPUNCTURE: CPT

## 2020-12-15 NOTE — ED PHYSICIAN DOCUMENTATION
PD HPI FEMALE 





- Stated complaint


Stated Complaint: FEMALE 





- Chief complaint


Chief Complaint: Abd Pain





- History obtained from


History obtained from: Patient





- Additional information


Additional information: 





25-year-old woman with past medical history of endometriosis, G2, P2 5 Months 

postpartum Presents with heavy vaginal bleeding for the past 5 days associated 

with intermittent lightheadedness and nausea.  She was sent by her OB to check 

her blood work because she has been anemic in the past.  Denies abdominal pain, 

fever





Review of Systems


Ten Systems: 10 systems reviewed and negative


Constitutional: denies: Fever, Chills


GI: denies: Abdominal Pain


: reports: Vaginal bleeding





PD PAST MEDICAL HISTORY





- Past Medical History


Cardiovascular: None


Respiratory: None


Neuro: None


Endocrine/Autoimmune: None


GI: None


GYN: Endometriosis


: None


HEENT: None


Psych: None


Musculoskeletal: None


Derm: None





- Past Surgical History


Past Surgical History: No


/GYN: Other





- Present Medications


Home Medications: 


                                Ambulatory Orders











 Medication  Instructions  Recorded  Confirmed


 


Docusate Sodium 100 mg PO DAILY #30 capsule 01/24/20 10/09/20


 


Naproxen 375 mg PO BID #20 tablet 01/24/20 10/09/20














- Allergies


Allergies/Adverse Reactions: 


                                    Allergies











Allergy/AdvReac Type Severity Reaction Status Date / Time


 


gluten AdvReac  Unknown Verified 12/15/20 10:17


 


latex AdvReac  Itching Verified 12/15/20 10:17


 


Milk Containing Products AdvReac  Unknown Verified 12/15/20 10:17














- Social History


Does the pt smoke?: No


Smoking Status: Never smoker


Does the pt drink ETOH?: No


Does the pt have substance abuse?: No





- Immunizations


Immunizations are current?: Yes





- POLST


Patient has POLST: No





PD ED PE NORMAL





- Vitals


Vital signs reviewed: Yes





- General


General: Alert and oriented X 3





- HEENT


HEENT: Atraumatic, PERRL, EOMI





- Neck


Neck: Supple, no meningeal sign





- Cardiac


Cardiac: RRR





- Respiratory


Respiratory: No respiratory distress, Clear bilaterally





- Abdomen


Abdomen: Soft, Non tender, Non distended





- Female 


Female : Chaperone present (bree Tsang. normal speculum/ female  exam. blood 

in the vault)





- Rectal


Rectal: Deferred





- Back


Back: No CVA TTP





- Derm


Derm: Normal color





- Extremities


Extremities: No deformity





- Neuro


Neuro: Alert and oriented X 3





- Psych


Psych: Normal mood, Normal affect





Results





- Vitals


Vitals: 


                               Vital Signs - 24 hr











  12/15/20





  10:18


 


Temperature 36.9 C


 


Heart Rate 85


 


Respiratory 18





Rate 


 


Blood Pressure 120/77


 


O2 Saturation 100








                                     Oxygen











O2 Source                      Room air

















- Labs


Labs: 


                                Laboratory Tests











  12/15/20 12/15/20 12/15/20





  10:30 10:36 10:36


 


WBC   5.3 


 


RBC   4.33 


 


Hgb   12.4 


 


Hct   37.9 


 


MCV   87.5 


 


MCH   28.6 


 


MCHC   32.7 


 


RDW   12.7 


 


Plt Count   243 


 


MPV   10.0 


 


Neut # (Auto)   3.1 


 


Lymph # (Auto)   1.8 


 


Mono # (Auto)   0.3 


 


Eos # (Auto)   0.1 


 


Baso # (Auto)   0.0 


 


Absolute Nucleated RBC   0.00 


 


Nucleated RBC %   0.0 


 


Sodium    141


 


Potassium    3.9


 


Chloride    108


 


Carbon Dioxide    25


 


Anion Gap    8.0


 


BUN    15


 


Creatinine    0.7


 


Estimated GFR (MDRD)    102


 


Glucose    90


 


Calcium    9.2


 


Total Bilirubin    0.8


 


AST    15


 


ALT    15


 


Alkaline Phosphatase    89


 


Total Protein    7.1


 


Albumin    4.3


 


Globulin    2.8


 


Albumin/Globulin Ratio    1.5


 


Lipase    36


 


Urine Color  YELLOW  


 


Urine Clarity  CLEAR  


 


Urine pH  5.5  


 


Ur Specific Gravity  >=1.030 H  


 


Urine Protein  TRACE  


 


Urine Glucose (UA)  NEGATIVE  


 


Urine Ketones  NEGATIVE  


 


Urine Occult Blood  LARGE H  


 


Urine Nitrite  NEGATIVE  


 


Urine Bilirubin  NEGATIVE  


 


Urine Urobilinogen  0.2 (NORMAL)  


 


Ur Leukocyte Esterase  NEGATIVE  


 


Urine RBC  TNTC H  


 


Urine WBC  0-3  


 


Ur Squamous Epith Cells  MANY Squamous H  


 


Urine Bacteria  Many H  


 


Urine Mucus  Marked Strands  


 


Ur Microscopic Review  INDICATED  


 


Urine Culture Comments  NOT INDICATED  


 


Urine HCG, Qual  NEGATIVE  














PD MEDICAL DECISION MAKING





- ED course


Complexity details: reviewed results, d/w patient


ED course: 





25-year-old woman with heavy menstrual bleeding, stable hemoglobin.  Education 

given, return precautions given.  Follow-up with OB/GYN with this week





Departure





- Departure


Disposition: 01 Home, Self Care


Clinical Impression: 


 Vaginal bleeding, Mild anemia





Condition: Good


Instructions:  Anemia


Comments: 


You have been seen in the emergency department for screening for anemia and 

heavy vaginal bleeding. Your hemoglobin was 12.4 which is in the lower range of 

normal.  You should follow-up with your OB/GYN this week.  We did testing for 

gonorrhea and chlamydia on your request.  There is no indication on your pelvic 

exam that you have a sexually transmitted infection, but the results will be 

able to be accessed on the patient health portal on our [facility] website.  

Return to the ED for any new or worsening symptoms.


Discharge Date/Time: 12/15/20 11:31

## 2020-12-29 ENCOUNTER — HOSPITAL ENCOUNTER (OUTPATIENT)
Dept: HOSPITAL 76 - DI | Age: 25
Discharge: HOME | End: 2020-12-29
Attending: ADVANCED PRACTICE MIDWIFE
Payer: COMMERCIAL

## 2020-12-29 DIAGNOSIS — R93.89: Primary | ICD-10-CM

## 2020-12-29 DIAGNOSIS — N83.291: ICD-10-CM

## 2020-12-30 NOTE — ULTRASOUND REPORT
PROCEDURE:  Pelvic w/Transvaginal

 

INDICATIONS:  MENORRHAGIA

 

TECHNIQUE:  

Real-time scanning was performed of the pelvic organs, with image documentation.  Additional endovagi
nal scanning was necessary due to incomplete visualization of the adnexal and endometrial structures 
by transabdominal scanning.  

 

COMPARISON:  OB ultrasound dated 4/8/2020 and 3/6/2020.

 

FINDINGS:  

Transabdominal scanning:  Limited scanning through the kidneys shows no hydronephrosis.  No pathologi
c free abdominal or pelvic fluid.  

 

Endovaginal scanning:  

Uterus:  Uterus is normal in size at 7.9 x 4.4 x 5.9 cm.  The endometrium measures 12.8 mm in combine
d thickness.  There is no discrete uterine fibroid. No gross endometrial mass is identified. Some vas
cularity is noted within endometrium. Trace amount of fluid is noted within endocervical canal.

 

Ovaries:  Right ovary measures 5.1 x 3.9 x 3.1 cm in size. Simple cysts with internal septation is no
ramiro in right ovary measures 2.7 x 1.9 x 1.8 cm in size. Greater than 12 follicles are noted in right 
ovary. Left ovary measures 3.8 x 1.3 x 4.1 cm in size and contains greater than 12 follicles. No defi
nite solid appearing ovarian lesion. No evidence of ovarian torsion.

 

IMPRESSION:  

1. Thickened endometrium with slight internal vascularity. No discrete endometrial mass is seen. Trac
e amount of endocervical fluid.

2. Right ovarian cyst as above. Greater than 12 follicles are noted in bilateral ovaries. No gross so
lid appearing ovarian lesion. No evidence of torsion.

 

Reviewed by: Austin Silva MD on 12/30/2020 9:51 AM PST

Approved by: Austin Silva MD on 12/30/2020 9:51 AM PST

 

 

Station ID:  [id number]

## 2021-01-28 ENCOUNTER — HOSPITAL ENCOUNTER (OUTPATIENT)
Dept: HOSPITAL 76 - LAB.R | Age: 26
Discharge: HOME | End: 2021-01-28
Attending: PHYSICIAN ASSISTANT
Payer: COMMERCIAL

## 2021-01-28 DIAGNOSIS — Z20.822: ICD-10-CM

## 2021-01-28 DIAGNOSIS — R05: Primary | ICD-10-CM

## 2021-01-28 PROCEDURE — 87276 INFLUENZA A AG IF: CPT

## 2021-01-28 PROCEDURE — 87275 INFLUENZA B AG IF: CPT

## 2021-01-31 ENCOUNTER — HOSPITAL ENCOUNTER (EMERGENCY)
Dept: HOSPITAL 76 - ED | Age: 26
Discharge: HOME | End: 2021-01-31
Payer: COMMERCIAL

## 2021-01-31 VITALS — SYSTOLIC BLOOD PRESSURE: 129 MMHG | DIASTOLIC BLOOD PRESSURE: 73 MMHG

## 2021-01-31 DIAGNOSIS — B97.89: ICD-10-CM

## 2021-01-31 DIAGNOSIS — J20.8: Primary | ICD-10-CM

## 2021-01-31 PROCEDURE — 99283 EMERGENCY DEPT VISIT LOW MDM: CPT

## 2021-01-31 PROCEDURE — 99284 EMERGENCY DEPT VISIT MOD MDM: CPT

## 2021-01-31 NOTE — ED PHYSICIAN DOCUMENTATION
PD HPI HEENT





- Stated complaint


Stated Complaint: COUGH X8 DAYS





- Chief complaint


Chief Complaint: Resp





- History obtained from


History obtained from: Patient





- Additional information


Additional information: 





8 days of nonproductive cough causing some mild chest pain and trouble 

breathing.  She was seen in urgent care, strep and flu were negative and 

evidently there is a Covid test pending.  She was given Tessalon which is not 

very helpful.  She does not know if she has a fever, her thermometer is broken.





Review of Systems


Constitutional: reports: Chills.  denies: Fever


Throat: reports: Sore throat (only w cough)


Respiratory: reports: Dyspnea, Cough


GI: reports: Reviewed and negative





PD PAST MEDICAL HISTORY





- Past Medical History


Cardiovascular: None


Respiratory: None


Neuro: None


Endocrine/Autoimmune: None


GI: None


GYN: Endometriosis


: None


HEENT: None


Psych: None


Musculoskeletal: None


Derm: None





- Past Surgical History


Past Surgical History: No


/GYN: Other





- Present Medications


Home Medications: 


                                Ambulatory Orders











 Medication  Instructions  Recorded  Confirmed


 


Docusate Sodium 100 mg PO DAILY #30 capsule 01/24/20 10/09/20


 


Naproxen 375 mg PO BID #20 tablet 01/24/20 10/09/20


 


Albuterol Sulf [Ventolin Hfa 1 - 2 puffs INH Q4HR PRN #1 inhaler 01/31/21 





Inhaler]   


 


Codeine Sulfate 1 tab PO Q6H PRN #15 tablet 01/31/21 


 


predniSONE [Deltasone] 20 mg PO [id number] #21 tab 01/31/21 














- Allergies


Allergies/Adverse Reactions: 


                                    Allergies











Allergy/AdvReac Type Severity Reaction Status Date / Time


 


gluten AdvReac  Unknown Verified 01/31/21 13:31


 


latex AdvReac  Itching Verified 01/31/21 13:31


 


Milk Containing Products AdvReac  Unknown Verified 01/31/21 13:31














- Social History


Does the pt smoke?: No


Smoking Status: Never smoker


Does the pt drink ETOH?: No


Does the pt have substance abuse?: No





- Immunizations


Immunizations are current?: Yes





- POLST


Patient has POLST: No





PD ED PE NORMAL





- Vitals


Vital signs reviewed: Yes





- General


General: Alert and oriented X 3, No acute distress





- Neck


Neck: Supple, no meningeal sign, No bony TTP





- Cardiac


Cardiac: RRR, No murmur





- Respiratory


Respiratory: No respiratory distress, Other (Mild expiratory wheezing without 

focal findings)





- Abdomen


Abdomen: Non tender





- Neuro


Neuro: Alert and oriented X 3, Normal speech





Results





- Vitals


Vitals: 


                               Vital Signs - 24 hr











  01/31/21





  13:31


 


Temperature 37.1 C


 


Heart Rate 90


 


Respiratory 20





Rate 


 


Blood Pressure 129/73


 


O2 Saturation 99








                                     Oxygen











O2 Source                      Room air

















PD MEDICAL DECISION MAKING





- ED course


ED course: 





25-year-old woman with persistent cough and mild wheezing.  No evidence of 

bacterial infection.  Two-view chest interpreted contemporaneously by me was 

negative.





Departure





- Departure


Disposition: 01 Home, Self Care


Clinical Impression: 


 Viral bronchitis





Condition: Good


Record reviewed to determine appropriate education?: Yes


Instructions:  ED Bronchitis Asthmatic


Prescriptions: 


Albuterol Sulf [Ventolin Hfa Inhaler] 1 - 2 puffs INH Q4HR PRN #1 inhaler


 PRN Reason: Shortness Of Air/Wheezing


Codeine Sulfate 1 tab PO Q6H PRN #15 tablet


 PRN Reason: Cough


predniSONE [Deltasone] 20 mg PO [id number] #21 tab


Comments: 


Return if you worsen.  Follow-up with your doctor in a week if not improved.


Discharge Date/Time: 01/31/21 14:34

## 2021-01-31 NOTE — XRAY REPORT
PROCEDURE:  Chest 2 View X-Ray

 

INDICATIONS:  cough

 

TECHNIQUE:  2 view(s) of the chest.  

 

COMPARISON:  None.

 

FINDINGS:  

 

Surgical changes and devices:  Incidental note is made of body ornamentation artifact.   

 

Lungs and pleura:  No pleural effusions or pneumothorax.  Lungs are clear.  

 

Mediastinum:  Mediastinal contours are normal.  Heart size is normal.  

 

Bones and chest wall:  No suspicious bony abnormalities.  Minimal to mild levoconvex thoracolumbar sc
lerotic curvature can be seen. Soft tissues appear unremarkable.  

 

 

 

 

IMPRESSION:  

 

No definite, focal infiltrates are seen.

 

If there is strong clinical concern for a developing or new pulmonary process, please consider a shor
t-term follow-up 2 view chest series, performed in deep inspiration.  

 

 

Reviewed by: Juan Carlos Masterson MD on 1/31/2021 1:20 PM [facility]

Approved by: Juan Carlos Masterson MD on 1/31/2021 1:20 PM [facility]

 

 

Station ID:  [id number]

## 2021-02-22 ENCOUNTER — HOSPITAL ENCOUNTER (OUTPATIENT)
Dept: HOSPITAL 76 - DI.N | Age: 26
Discharge: HOME | End: 2021-02-22
Attending: NURSE PRACTITIONER
Payer: COMMERCIAL

## 2021-02-22 DIAGNOSIS — R06.2: Primary | ICD-10-CM

## 2021-02-22 NOTE — XRAY REPORT
PROCEDURE:  Chest 2 View X-Ray

 

INDICATIONS:  WHEEZING

 

TECHNIQUE:  2 view(s) of the chest.  

 

COMPARISON:  None.

 

FINDINGS:  

 

Surgical changes and devices:  None.  

 

Lungs and pleura:  No pleural effusions or pneumothorax.  Lungs are clear.  

 

Mediastinum:  Mediastinal contours are normal.  Heart size is normal.  

 

Bones and chest wall:  No suspicious bony abnormalities.  Soft tissues appear unremarkable.  

 

IMPRESSION:  Source and wheezing is not seen.

 

Reviewed by: Adrián Pearce MD on 2/22/2021 7:06 PM PST

Approved by: Adrián Pearce MD on 2/22/2021 7:06 PM PST

 

 

Station ID:  [id number]

## 2021-04-21 ENCOUNTER — HOSPITAL ENCOUNTER (OUTPATIENT)
Dept: HOSPITAL 76 - LAB.WCP | Age: 26
Discharge: HOME | End: 2021-04-21
Attending: OBSTETRICS & GYNECOLOGY
Payer: COMMERCIAL

## 2021-04-21 DIAGNOSIS — N92.0: ICD-10-CM

## 2021-04-21 DIAGNOSIS — E28.2: Primary | ICD-10-CM

## 2021-04-21 LAB
ERYTHROCYTE [DISTWIDTH] IN BLOOD BY AUTOMATED COUNT: 14.3 % (ref 12–15)
FSH SERPL-ACNC: 5.03 MIU/ML
HCT VFR BLD AUTO: 33.8 % (ref 37–47)
HGB UR QL STRIP: 10.5 G/DL (ref 12–16)
LH SERPL-ACNC: 6.43 MIU/ML
MCH RBC QN AUTO: 24.8 PG (ref 27–31)
MCHC RBC AUTO-ENTMCNC: 31.1 G/DL (ref 32–36)
MCV RBC AUTO: 79.9 FL (ref 81–99)
NEUTROPHILS # SNV AUTO: 5.2 X10^3/UL (ref 4.8–10.8)
PDW BLD AUTO: 11.2 FL (ref 7.9–10.8)
PLATELET # BLD: 301 10^3/UL (ref 130–450)
RBC MAR: 4.23 10^6/UL (ref 4.2–5.4)
TSH SERPL-ACNC: 1.31 UIU/ML (ref 0.34–5.6)

## 2021-04-21 PROCEDURE — 84402 ASSAY OF FREE TESTOSTERONE: CPT

## 2021-04-21 PROCEDURE — 84443 ASSAY THYROID STIM HORMONE: CPT

## 2021-04-21 PROCEDURE — 81599 UNLISTED MAAA: CPT

## 2021-04-21 PROCEDURE — 84403 ASSAY OF TOTAL TESTOSTERONE: CPT

## 2021-04-21 PROCEDURE — 83498 ASY HYDROXYPROGESTERONE 17-D: CPT

## 2021-04-21 PROCEDURE — 83001 ASSAY OF GONADOTROPIN (FSH): CPT

## 2021-04-21 PROCEDURE — 82627 DEHYDROEPIANDROSTERONE: CPT

## 2021-04-21 PROCEDURE — 83002 ASSAY OF GONADOTROPIN (LH): CPT

## 2021-04-21 PROCEDURE — 85027 COMPLETE CBC AUTOMATED: CPT

## 2021-04-21 PROCEDURE — 36415 COLL VENOUS BLD VENIPUNCTURE: CPT
